# Patient Record
Sex: MALE | Race: WHITE | NOT HISPANIC OR LATINO | Employment: OTHER | URBAN - NONMETROPOLITAN AREA
[De-identification: names, ages, dates, MRNs, and addresses within clinical notes are randomized per-mention and may not be internally consistent; named-entity substitution may affect disease eponyms.]

---

## 2017-02-03 ENCOUNTER — HOSPITAL ENCOUNTER (EMERGENCY)
Facility: HOSPITAL | Age: 62
Discharge: HOME | End: 2017-02-03
Attending: EMERGENCY MEDICINE | Admitting: EMERGENCY MEDICINE

## 2017-02-03 VITALS
WEIGHT: 210 LBS | SYSTOLIC BLOOD PRESSURE: 148 MMHG | HEART RATE: 61 BPM | OXYGEN SATURATION: 100 % | HEIGHT: 71 IN | DIASTOLIC BLOOD PRESSURE: 80 MMHG | BODY MASS INDEX: 29.4 KG/M2 | TEMPERATURE: 97.9 F | RESPIRATION RATE: 18 BRPM

## 2017-02-03 DIAGNOSIS — S05.01XA CORNEAL ABRASION, RIGHT, INITIAL ENCOUNTER: Primary | ICD-10-CM

## 2017-02-03 PROCEDURE — 99284 EMERGENCY DEPT VISIT MOD MDM: CPT

## 2017-02-03 RX ORDER — TETRACAINE HYDROCHLORIDE 5 MG/ML
2 SOLUTION OPHTHALMIC ONCE
Status: COMPLETED | OUTPATIENT
Start: 2017-02-03 | End: 2017-02-03

## 2017-02-03 RX ORDER — ERYTHROMYCIN 5 MG/G
1 OINTMENT OPHTHALMIC ONCE
Status: COMPLETED | OUTPATIENT
Start: 2017-02-03 | End: 2017-02-03

## 2017-02-03 RX ORDER — ERYTHROMYCIN 5 MG/G
OINTMENT OPHTHALMIC EVERY 6 HOURS
Qty: 3.5 G | Refills: 0 | Status: SHIPPED | OUTPATIENT
Start: 2017-02-03 | End: 2017-11-13

## 2017-02-03 RX ADMIN — FLUORESCEIN SODIUM 1 STRIP: 1 STRIP OPHTHALMIC at 20:59

## 2017-02-03 RX ADMIN — TETRACAINE HYDROCHLORIDE 2 DROP: 5 SOLUTION OPHTHALMIC at 21:00

## 2017-02-03 RX ADMIN — ERYTHROMYCIN 1 APPLICATION: 5 OINTMENT OPHTHALMIC at 21:47

## 2017-02-04 NOTE — DISCHARGE INSTRUCTIONS
Use over-the-counter Tylenol as needed for discomfort use the eye ointment as directed avoid direct sunlight avoid bright light follow-up with Dr. Donal Valenzuela call office Monday return to the ER for any sudden changes or worsening pain despite medication the next 24 hours

## 2017-02-04 NOTE — ED PROVIDER NOTES
Subjective   HPI Comments: Patient is here with his wife he reports at work today he felt something hit his right eye piece of with material and feels like there still might be something in the right eye he denies any loss of vision no other systemic complaints patient does not wear glasses, presents here for further evaluation      Review of Systems   Constitutional: Negative.    HENT: Negative.    Eyes: Positive for photophobia. Negative for pain and discharge.   Respiratory: Negative.    Cardiovascular: Negative.    Musculoskeletal: Negative.    Neurological: Negative.    Psychiatric/Behavioral: Negative.    All other systems reviewed and are negative.      Past Medical History   Diagnosis Date   • ACTH deficiency    • ACTH deficiency    • Bursitis of elbow    • Current every day smoker        Allergies   Allergen Reactions   • Wellbutrin [Bupropion]        History reviewed. No pertinent past surgical history.    Family History   Problem Relation Age of Onset   • Cancer Father        Social History     Social History   • Marital status:      Spouse name: N/A   • Number of children: N/A   • Years of education: N/A     Social History Main Topics   • Smoking status: Former Smoker     Quit date: 1/3/2017   • Smokeless tobacco: None   • Alcohol use Yes      Comment: RARE   • Drug use: No   • Sexual activity: Not Asked     Other Topics Concern   • None     Social History Narrative   • None           Objective   Physical Exam   Constitutional: He is oriented to person, place, and time. He appears well-developed and well-nourished.   Cheerful nontoxic well-appearing no acute distress   HENT:   Head: Normocephalic and atraumatic.   Eyes: EOM are normal. Pupils are equal, round, and reactive to light.   There is some slight erythema medial canthus sclera righ  No Hyphema  Noted flourescein reuptake linear 6 o'clock position right  No fb... Examined with slit lamp  t   Neck: Normal range of motion. Neck supple.    Cardiovascular: Normal rate and regular rhythm.    Pulmonary/Chest: Effort normal and breath sounds normal.   Neurological: He is alert and oriented to person, place, and time.   Skin: Skin is warm and dry. No rash noted. No erythema.   Psychiatric: He has a normal mood and affect. His behavior is normal.   Nursing note and vitals reviewed.      Procedures         ED Course  ED Course   Comment By Time   Acuity 20/25 left eye and 20/25 right eye and both Abhi Pham PA-C 02/03 2054   Was examined with slit lamp exam no foreign body noted Abhi Pham PA-C 02/03 2121   There was noted fluorescein reuptake 6 o'clock position right cornea linear in description but again no foreign body Abhi Pham PA-C 02/03 2135                  MDM    Final diagnoses:   Corneal abrasion, right, initial encounter            Abhi Pham PA-C  02/03/17 2122       Abhi Pham PA-C  02/03/17 2135       Abhi Pham PA-C  02/03/17 2136

## 2017-05-30 RX ORDER — PREDNISONE 1 MG/1
TABLET ORAL
Qty: 90 TABLET | Refills: 2 | OUTPATIENT
Start: 2017-05-30

## 2017-09-18 RX ORDER — PREDNISONE 1 MG/1
TABLET ORAL
Qty: 90 TABLET | Refills: 0 | Status: SHIPPED | OUTPATIENT
Start: 2017-09-18 | End: 2017-11-13 | Stop reason: SDUPTHER

## 2017-10-16 ENCOUNTER — FLU SHOT (OUTPATIENT)
Dept: INTERNAL MEDICINE | Facility: CLINIC | Age: 62
End: 2017-10-16

## 2017-10-16 PROCEDURE — 90471 IMMUNIZATION ADMIN: CPT | Performed by: FAMILY MEDICINE

## 2017-10-16 PROCEDURE — 90686 IIV4 VACC NO PRSV 0.5 ML IM: CPT | Performed by: FAMILY MEDICINE

## 2017-11-13 ENCOUNTER — OFFICE VISIT (OUTPATIENT)
Dept: INTERNAL MEDICINE | Facility: CLINIC | Age: 62
End: 2017-11-13

## 2017-11-13 VITALS
TEMPERATURE: 98.1 F | RESPIRATION RATE: 12 BRPM | WEIGHT: 217 LBS | HEIGHT: 71 IN | DIASTOLIC BLOOD PRESSURE: 76 MMHG | OXYGEN SATURATION: 98 % | SYSTOLIC BLOOD PRESSURE: 144 MMHG | HEART RATE: 62 BPM | BODY MASS INDEX: 30.38 KG/M2

## 2017-11-13 DIAGNOSIS — E78.5 HYPERLIPIDEMIA LDL GOAL <130: Chronic | ICD-10-CM

## 2017-11-13 DIAGNOSIS — E23.6 ACTH DEFICIENCY (HCC): Primary | ICD-10-CM

## 2017-11-13 PROCEDURE — 99213 OFFICE O/P EST LOW 20 MIN: CPT | Performed by: FAMILY MEDICINE

## 2017-11-13 RX ORDER — PREDNISONE 5 MG/1
1 TABLET ORAL TAKE AS DIRECTED
Qty: 1 EACH | Refills: 1 | Status: SHIPPED | OUTPATIENT
Start: 2017-11-13 | End: 2018-01-08 | Stop reason: SDUPTHER

## 2017-11-13 RX ORDER — PREDNISONE 1 MG/1
5 TABLET ORAL DAILY
Qty: 30 TABLET | Refills: 11 | Status: SHIPPED | OUTPATIENT
Start: 2017-11-13 | End: 2018-11-14 | Stop reason: SDUPTHER

## 2017-11-13 NOTE — PROGRESS NOTES
Subjective    Yovany Gonzalez is a 62 y.o. male here for:  Chief Complaint   Patient presents with   • Establish Care     Transfer care form Dr. Baer to Dr. Ewing   • ACTH deficiency     History of Present Illness   He has an ACTH deficiency which was diagnosed previously by [facility] after a prolonged course of illness. Takes prednisone 5 mg daily and will for rest of life. He sometimes has to take more, such as 10-15 mg if he's sick. When this happens, he runs out early on his maintenance dose.     The following portions of the patient's history were reviewed and updated as appropriate: allergies, current medications, past family history, past medical history, past social history, past surgical history and problem list.    Review of Systems   Respiratory: Negative for shortness of breath.    Cardiovascular: Negative for chest pain.       Vitals:    11/13/17 1548   BP: 144/76   Pulse: 62   Resp: 12   Temp: 98.1 °F (36.7 °C)   SpO2: 98%       Objective   Physical Exam   Constitutional: He is oriented to person, place, and time. Vital signs are normal. He appears well-developed and well-nourished. He is active. He does not have a sickly appearance. He does not appear ill.   Appears stated age. Well groomed.   HENT:   Head: Normocephalic and atraumatic.   Right Ear: Hearing normal.   Left Ear: Hearing normal.   Nose: Nose normal.   Mouth/Throat: Mucous membranes are not dry.   Eyes: EOM and lids are normal. Pupils are equal, round, and reactive to light. No scleral icterus.   Cardiovascular: Normal rate, regular rhythm and normal heart sounds.  Exam reveals no gallop and no friction rub.    No murmur heard.  Pulmonary/Chest: Effort normal and breath sounds normal.   Musculoskeletal: He exhibits no deformity.   Neurological: He is alert and oriented to person, place, and time. He displays no tremor. No cranial nerve deficit. Gait normal.   Skin: Skin is warm. He is not diaphoretic. No cyanosis. Nails show no  clubbing.   Psychiatric: He has a normal mood and affect. His speech is normal and behavior is normal. Judgment and thought content normal. Cognition and memory are normal.   Nursing note and vitals reviewed.      Assessment/Plan   Yovany was seen today for establish care and acth deficiency.    Diagnoses and all orders for this visit:    ACTH deficiency  -     PredniSONE 5 MG (48) tablet therapy pack dosepak; Take 1 tablet by mouth Take As Directed. Take as directed on package instructions.  -     predniSONE (DELTASONE) 5 MG tablet; Take 1 tablet by mouth Daily.  -     CBC & Differential  -     Comprehensive Metabolic Panel  -     TSH  -     T4, Free  -     Iron Profile  -     Vitamin B12 & Folate  -     Hemoglobin A1c  -     Celiac Disease Panel  -     Prolactin  -     Testosterone  -     Androstenedione  -     ACTH  -     Cortisol - AM    Hyperlipidemia LDL goal <130  -     Lipid Panel      For future reference regarding Mr. Hodgson's condition:    Diagnosis and Treatment of Primary Adrenal Insufficiency: An Endocrine Society Clinical Practice Guideline   Stefan R. Bornstein Bruno Allolio Wiebke Arlt Andreas Barthel Andrew Don-Wauchope Gary D. Hammer Eystein S. Husebye Deborah P. Merke M. Hassan Murad Constantine A. Stratakis ... Show more\[email or web address] Journal of Clinical Endocrinology & Metabolism, Volume 101, Issue 2, 1 February 2016, Pages 364-389, [email or web address]: 01 February 2016  [email or web address]    Records from [facility] not available at this time for review.           Fernanda Ewing MD

## 2017-11-16 PROBLEM — E78.5 HYPERLIPIDEMIA LDL GOAL <130: Chronic | Status: ACTIVE | Noted: 2017-11-16

## 2017-12-30 LAB
ACTH PLAS-MCNC: <1.1 PG/ML (ref 7.2–63.3)
ALBUMIN SERPL-MCNC: 4 G/DL (ref 3.5–5)
ALBUMIN/GLOB SERPL: 1.5 G/DL (ref 1–2)
ALP SERPL-CCNC: 53 U/L (ref 38–126)
ALT SERPL-CCNC: 30 U/L (ref 13–69)
ANDROST SERPL-MCNC: 32 NG/DL (ref 22–96)
AST SERPL-CCNC: 24 U/L (ref 15–46)
BASOPHILS # BLD AUTO: 0.07 10*3/MM3 (ref 0–0.2)
BASOPHILS NFR BLD AUTO: 1.1 % (ref 0–2.5)
BILIRUB SERPL-MCNC: 0.5 MG/DL (ref 0.2–1.3)
BUN SERPL-MCNC: 24 MG/DL (ref 7–20)
BUN/CREAT SERPL: 24 (ref 6.3–21.9)
CALCIUM SERPL-MCNC: 9.5 MG/DL (ref 8.4–10.2)
CHLORIDE SERPL-SCNC: 102 MMOL/L (ref 98–107)
CHOLEST SERPL-MCNC: 238 MG/DL (ref 0–199)
CO2 SERPL-SCNC: 33 MMOL/L (ref 26–30)
CORTIS AM PEAK SERPL-MCNC: 1.5 UG/DL (ref 6.2–19.4)
CREAT SERPL-MCNC: 1 MG/DL (ref 0.6–1.3)
ENDOMYSIUM IGA SER QL: NEGATIVE
EOSINOPHIL # BLD AUTO: 0.32 10*3/MM3 (ref 0–0.7)
EOSINOPHIL NFR BLD AUTO: 5 % (ref 0–7)
ERYTHROCYTE [DISTWIDTH] IN BLOOD BY AUTOMATED COUNT: 12.6 % (ref 11.5–14.5)
FOLATE SERPL-MCNC: 9.86 NG/ML
GLOBULIN SER CALC-MCNC: 2.6 GM/DL
GLUCOSE SERPL-MCNC: 78 MG/DL (ref 74–98)
HBA1C MFR BLD: 5.3 %
HCT VFR BLD AUTO: 41.8 % (ref 42–52)
HDLC SERPL-MCNC: 53 MG/DL (ref 40–60)
HGB BLD-MCNC: 13.2 G/DL (ref 14–18)
IGA SERPL-MCNC: 104 MG/DL (ref 61–437)
IMM GRANULOCYTES # BLD: 0.02 10*3/MM3 (ref 0–0.06)
IMM GRANULOCYTES NFR BLD: 0.3 % (ref 0–0.6)
IRON SATN MFR SERPL: 48 % (ref 11–46)
IRON SERPL-MCNC: 106 MCG/DL (ref 37–181)
LDLC SERPL CALC-MCNC: 166 MG/DL (ref 0–99)
LYMPHOCYTES # BLD AUTO: 1.86 10*3/MM3 (ref 0.6–3.4)
LYMPHOCYTES NFR BLD AUTO: 29 % (ref 10–50)
MCH RBC QN AUTO: 27.4 PG (ref 27–31)
MCHC RBC AUTO-ENTMCNC: 31.6 G/DL (ref 30–37)
MCV RBC AUTO: 86.7 FL (ref 80–94)
MONOCYTES # BLD AUTO: 0.55 10*3/MM3 (ref 0–0.9)
MONOCYTES NFR BLD AUTO: 8.6 % (ref 0–12)
NEUTROPHILS # BLD AUTO: 3.59 10*3/MM3 (ref 2–6.9)
NEUTROPHILS NFR BLD AUTO: 56 % (ref 37–80)
NRBC BLD AUTO-RTO: 0 /100 WBC (ref 0–0)
PLATELET # BLD AUTO: 198 10*3/MM3 (ref 130–400)
POTASSIUM SERPL-SCNC: 4.2 MMOL/L (ref 3.5–5.1)
PROLACTIN SERPL-MCNC: 9.5 NG/ML (ref 4–15.2)
PROT SERPL-MCNC: 6.6 G/DL (ref 6.3–8.2)
RBC # BLD AUTO: 4.82 10*6/MM3 (ref 4.7–6.1)
SODIUM SERPL-SCNC: 141 MMOL/L (ref 137–145)
T4 FREE SERPL-MCNC: 1 NG/DL (ref 0.78–2.19)
TESTOST SERPL-MCNC: 486 NG/DL (ref 264–916)
TIBC SERPL-MCNC: 220 MCG/DL (ref 261–497)
TRIGL SERPL-MCNC: 94 MG/DL
TSH SERPL DL<=0.005 MIU/L-ACNC: 2.76 MIU/ML (ref 0.47–4.68)
TTG IGA SER-ACNC: <2 U/ML (ref 0–3)
UIBC SERPL-MCNC: 114 MCG/DL
VIT B12 SERPL-MCNC: 484 PG/ML (ref 239–931)
VLDLC SERPL CALC-MCNC: 18.8 MG/DL
WBC # BLD AUTO: 6.41 10*3/MM3 (ref 4.8–10.8)

## 2018-01-02 ENCOUNTER — TELEPHONE (OUTPATIENT)
Dept: INTERNAL MEDICINE | Facility: CLINIC | Age: 63
End: 2018-01-02

## 2018-01-02 DIAGNOSIS — R79.89 ABNORMAL CBC: Primary | ICD-10-CM

## 2018-01-08 ENCOUNTER — OFFICE VISIT (OUTPATIENT)
Dept: INTERNAL MEDICINE | Facility: CLINIC | Age: 63
End: 2018-01-08

## 2018-01-08 VITALS
SYSTOLIC BLOOD PRESSURE: 124 MMHG | WEIGHT: 214 LBS | BODY MASS INDEX: 29.96 KG/M2 | HEART RATE: 84 BPM | RESPIRATION RATE: 14 BRPM | DIASTOLIC BLOOD PRESSURE: 80 MMHG | HEIGHT: 71 IN | OXYGEN SATURATION: 96 % | TEMPERATURE: 98.3 F

## 2018-01-08 DIAGNOSIS — R68.83 CHILLS: Primary | ICD-10-CM

## 2018-01-08 DIAGNOSIS — R11.10 VOMITING, INTRACTABILITY OF VOMITING NOT SPECIFIED, PRESENCE OF NAUSEA NOT SPECIFIED, UNSPECIFIED VOMITING TYPE: ICD-10-CM

## 2018-01-08 LAB
EXPIRATION DATE: NORMAL
FLUAV AG NPH QL: NEGATIVE
FLUBV AG NPH QL: NEGATIVE
INTERNAL CONTROL: NORMAL
Lab: NORMAL

## 2018-01-08 PROCEDURE — 87804 INFLUENZA ASSAY W/OPTIC: CPT | Performed by: FAMILY MEDICINE

## 2018-01-08 PROCEDURE — 99213 OFFICE O/P EST LOW 20 MIN: CPT | Performed by: FAMILY MEDICINE

## 2018-01-08 RX ORDER — ONDANSETRON 8 MG/1
8 TABLET, ORALLY DISINTEGRATING ORAL EVERY 8 HOURS PRN
Qty: 15 TABLET | Refills: 0 | Status: SHIPPED | OUTPATIENT
Start: 2018-01-08 | End: 2018-11-14

## 2018-01-08 NOTE — PROGRESS NOTES
Subjective    Yovany Gonzalez is a 63 y.o. male here for:  Chief Complaint   Patient presents with   • Vomiting   • Chills     History of Present Illness     Upset stomach and diarrhea. Some fevers last night. Chills as well. Achy and tired from not sleeping well.     The following portions of the patient's history were reviewed and updated as appropriate: allergies, current medications, past family history, past medical history, past social history, past surgical history and problem list.    Review of Systems   Constitutional: Positive for activity change, appetite change, chills, fatigue and fever.   Respiratory: Negative for shortness of breath.    Gastrointestinal: Positive for diarrhea and nausea.   Musculoskeletal: Positive for myalgias.   Psychiatric/Behavioral: Positive for sleep disturbance.       Vitals:    01/08/18 1149   BP: 124/80   Pulse: 84   Resp: 14   Temp: 98.3 °F (36.8 °C)   SpO2: 96%         Objective   Physical Exam   Constitutional: He is oriented to person, place, and time. Vital signs are normal. He appears well-developed and well-nourished. He is active. He does not have a sickly appearance. He does not appear ill.   Appears stated age. Well groomed.   HENT:   Head: Normocephalic and atraumatic.   Right Ear: Hearing normal.   Left Ear: Hearing normal.   Nose: Nose normal.   Mouth/Throat: Mucous membranes are not dry.   Eyes: EOM and lids are normal. Pupils are equal, round, and reactive to light. No scleral icterus.   Cardiovascular: Normal rate, regular rhythm and normal heart sounds.  Exam reveals no gallop and no friction rub.    No murmur heard.  Pulmonary/Chest: Effort normal and breath sounds normal.   Abdominal: Soft. Bowel sounds are normal. There is no tenderness. There is no rigidity, no rebound and no guarding.   Musculoskeletal: He exhibits no deformity.   Neurological: He is alert and oriented to person, place, and time. He displays no tremor. No cranial nerve deficit. Gait  normal.   Skin: Skin is warm. He is not diaphoretic. No cyanosis. Nails show no clubbing.   Psychiatric: He has a normal mood and affect. His speech is normal and behavior is normal. Judgment and thought content normal. Cognition and memory are normal.   Nursing note and vitals reviewed.      Lab Results   Component Value Date    RAPFLUA NEGATIVE 01/08/2018    RAPFLUB NEGATIVE 01/08/2018         Assessment/Plan       Yovany was seen today for vomiting and chills.    Diagnoses and all orders for this visit:    Chills  -     POCT Influenza A/B    Vomiting, intractability of vomiting not specified, presence of nausea not specified, unspecified vomiting type  -     POCT Influenza A/B  -     ondansetron ODT (ZOFRAN ODT) 8 MG disintegrating tablet; Take 1 tablet by mouth Every 8 (Eight) Hours As Needed for Nausea or Vomiting.        · Encouraged hydration, rest, tylenol/motrin for discomfort.  · Likely viral.  · Continue prednisone as prescribed for acth deficiency.      Fernanda Ewing MD    Please note that portions of this note were completed with a voice recognition program. Efforts were made to edit dictation, but occasionally words are mistranscribed.

## 2018-11-14 ENCOUNTER — OFFICE VISIT (OUTPATIENT)
Dept: INTERNAL MEDICINE | Facility: CLINIC | Age: 63
End: 2018-11-14

## 2018-11-14 VITALS
HEIGHT: 71 IN | DIASTOLIC BLOOD PRESSURE: 85 MMHG | HEART RATE: 65 BPM | SYSTOLIC BLOOD PRESSURE: 120 MMHG | WEIGHT: 214 LBS | BODY MASS INDEX: 29.96 KG/M2 | OXYGEN SATURATION: 97 % | TEMPERATURE: 98.7 F

## 2018-11-14 DIAGNOSIS — Z00.00 ANNUAL PHYSICAL EXAM: Primary | ICD-10-CM

## 2018-11-14 DIAGNOSIS — E23.6 ACTH DEFICIENCY (HCC): ICD-10-CM

## 2018-11-14 PROCEDURE — 99396 PREV VISIT EST AGE 40-64: CPT | Performed by: FAMILY MEDICINE

## 2018-11-14 RX ORDER — PREDNISONE 5 MG/1
1 TABLET ORAL TAKE AS DIRECTED
Qty: 1 EACH | Refills: 2 | Status: SHIPPED | OUTPATIENT
Start: 2018-11-14 | End: 2019-07-11 | Stop reason: SDUPTHER

## 2018-11-14 RX ORDER — PREDNISONE 1 MG/1
5 TABLET ORAL DAILY
Qty: 90 TABLET | Refills: 3 | Status: SHIPPED | OUTPATIENT
Start: 2018-11-14 | End: 2019-11-15 | Stop reason: SDUPTHER

## 2018-11-14 NOTE — PATIENT INSTRUCTIONS
Health Maintenance, Male  A healthy lifestyle and preventive care is important for your health and wellness. Ask your health care provider about what schedule of regular examinations is right for you.  What should I know about weight and diet?    Eat a Healthy Diet  · Eat plenty of vegetables, fruits, whole grains, low-fat dairy products, and lean protein.  · Do not eat a lot of foods high in solid fats, added sugars, or salt.     Maintain a Healthy Weight  Regular exercise can help you achieve or maintain a healthy weight. You should:  · Do at least 150 minutes of exercise each week. The exercise should increase your heart rate and make you sweat (moderate-intensity exercise).  · Do strength-training exercises at least twice a week.     Watch Your Levels of Cholesterol and Blood Lipids  · Have your blood tested for lipids and cholesterol every 5 years starting at 35 years of age. If you are at high risk for heart disease, you should start having your blood tested when you are 20 years old. You may need to have your cholesterol levels checked more often if:  ? Your lipid or cholesterol levels are high.  ? You are older than 50 years of age.  ? You are at high risk for heart disease.     What should I know about cancer screening?  Many types of cancers can be detected early and may often be prevented.  Lung Cancer  · You should be screened every year for lung cancer if:  ? You are a current smoker who has smoked for at least 30 years.  ? You are a former smoker who has quit within the past 15 years.  · Talk to your health care provider about your screening options, when you should start screening, and how often you should be screened.     Colorectal Cancer  · Routine colorectal cancer screening usually begins at 50 years of age and should be repeated every 5-10 years until you are 75 years old. You may need to be screened more often if early forms of precancerous polyps or small growths are found. Your health care  provider may recommend screening at an earlier age if you have risk factors for colon cancer.  · Your health care provider may recommend using home test kits to check for hidden blood in the stool.  · A small camera at the end of a tube can be used to examine your colon (sigmoidoscopy or colonoscopy). This checks for the earliest forms of colorectal cancer.     Prostate and Testicular Cancer  · Depending on your age and overall health, your health care provider may do certain tests to screen for prostate and testicular cancer.  · Talk to your health care provider about any symptoms or concerns you have about testicular or prostate cancer.     Skin Cancer  · Check your skin from head to toe regularly.  · Tell your health care provider about any new moles or changes in moles, especially if:  ? There is a change in a mole’s size, shape, or color.  ? You have a mole that is larger than a pencil eraser.  · Always use sunscreen. Apply sunscreen liberally and repeat throughout the day.  · Protect yourself by wearing long sleeves, pants, a wide-brimmed hat, and sunglasses when outside.     What should I know about heart disease, diabetes, and high blood pressure?  · If you are 18-39 years of age, have your blood pressure checked every 3-5 years. If you are 40 years of age or older, have your blood pressure checked every year. You should have your blood pressure measured twice--once when you are at a hospital or clinic, and once when you are not at a hospital or clinic. Record the average of the two measurements. To check your blood pressure when you are not at a hospital or clinic, you can use:  ? An automated blood pressure machine at a pharmacy.  ? A home blood pressure monitor.  · Talk to your health care provider about your target blood pressure.  · If you are between 45-79 years old, ask your health care provider if you should take aspirin to prevent heart disease.  · Have regular diabetes screenings by checking your  fasting blood sugar level.  ? If you are at a normal weight and have a low risk for diabetes, have this test once every three years after the age of 45.  ? If you are overweight and have a high risk for diabetes, consider being tested at a younger age or more often.  · A one-time screening for abdominal aortic aneurysm (AAA) by ultrasound is recommended for men aged 65-75 years who are current or former smokers.  What should I know about preventing infection?  Hepatitis B  If you have a higher risk for hepatitis B, you should be screened for this virus. Talk with your health care provider to find out if you are at risk for hepatitis B infection.  Hepatitis C  Blood testing is recommended for:  · Everyone born from 1945 through 1965.  · Anyone with known risk factors for hepatitis C.     Sexually Transmitted Diseases (STDs)  · You should be screened each year for STDs including gonorrhea and chlamydia if:  ? You are sexually active and are younger than 24 years of age.  ? You are older than 24 years of age and your health care provider tells you that you are at risk for this type of infection.  ? Your sexual activity has changed since you were last screened and you are at an increased risk for chlamydia or gonorrhea. Ask your health care provider if you are at risk.  · Talk with your health care provider about whether you are at high risk of being infected with HIV. Your health care provider may recommend a prescription medicine to help prevent HIV infection.     What else can I do?  ·   · Schedule regular health, dental, and eye exams.  · Stay current with your vaccines (immunizations).  · Do not use any tobacco products, such as cigarettes, chewing tobacco, and e-cigarettes. If you need help quitting, ask your health care provider.  · Limit alcohol intake to no more than 2 drinks per day. One drink equals 12 ounces of beer, 5 ounces of wine, or 1½ ounces of hard liquor.  · Do not use street drugs.  · Do not share  needles.  · Ask your health care provider for help if you need support or information about quitting drugs.  · Tell your health care provider if you often feel depressed.  · Tell your health care provider if you have ever been abused or do not feel safe at home.      This information is not intended to replace advice given to you by your health care provider. Make sure you discuss any questions you have with your health care provider.  Document Released: 06/15/2009 Document Revised: 08/16/2017 Document Reviewed: 09/20/2016  Elsevier Interactive Patient Education © 2018 [employer]vier Inc[employer]

## 2018-11-19 NOTE — PROGRESS NOTES
"11/14/2018  Chief Complaint   Patient presents with   • Annual Exam     Annual Physical         Yovany Gonzalez is here for his annual preventive exam.    Yovany Gonzalez has the following medical history:  Patient Active Problem List    Diagnosis   • Hyperlipidemia LDL goal <130 [E78.5]   • ACTH deficiency (CMS/HCC) [E23.6]     Overall healthy. Continues to take prednisone due to acth deficiency diagnosed previously at [facility]. He needs a refill on the prednisone dose packs which he takes prn illness so that he does not run out monthly.     Yovany Gonzalez is up to date on eye exam.  he is up to date on dental exam.    Screenings: declines colonoscopy at this time, okay to send order for Cologuard. Due for hep C screen.    Vaccinations: declines tetanus at this time, up to date on influenza    Vitals:    11/14/18 1550   BP: 120/85   Pulse: 65   Temp: 98.7 °F (37.1 °C)   SpO2: 97%   Weight: 97.1 kg (214 lb)   Height: 180.3 cm (70.98\")     Patient's Body mass index is 29.86 kg/m². BMI is above normal parameters. Recommendations include: educational material and nutrition counseling.      Review of Systems   Constitutional: Positive for activity change (retired from job).   Respiratory: Negative for shortness of breath.    Cardiovascular: Negative for chest pain.   Gastrointestinal: Negative for abdominal pain.   Psychiatric/Behavioral: Positive for stress.   All other systems reviewed and are negative.      Physical Exam   Constitutional: He is oriented to person, place, and time. Vital signs are normal. He appears well-developed and well-nourished. He is active.  Non-toxic appearance. He does not have a sickly appearance. He does not appear ill. No distress.   HENT:   Head: Normocephalic and atraumatic. Hair is normal.   Right Ear: Hearing, tympanic membrane, external ear and ear canal normal.   Left Ear: Hearing, tympanic membrane, external ear and ear canal normal.   Nose: Nose normal.   Mouth/Throat: Uvula is " midline, oropharynx is clear and moist and mucous membranes are normal. Normal dentition.   Eyes: Conjunctivae, EOM and lids are normal. Pupils are equal, round, and reactive to light. No scleral icterus.   Neck: Trachea normal and phonation normal. Neck supple.   Cardiovascular: Normal rate, regular rhythm, normal heart sounds and intact distal pulses.   Pulmonary/Chest: Effort normal and breath sounds normal. He exhibits no deformity.   Abdominal: Soft. Bowel sounds are normal. There is no tenderness.   Lymphadenopathy:        Head (right side): No submandibular adenopathy present.        Head (left side): No submandibular adenopathy present.     He has no cervical adenopathy.   Neurological: He is alert and oriented to person, place, and time. He has normal strength. He displays no tremor. No cranial nerve deficit. He exhibits normal muscle tone. He displays no seizure activity. Gait normal.   Skin: Skin is warm. Capillary refill takes less than 2 seconds. Turgor is normal. No rash noted. He is not diaphoretic. No cyanosis. No pallor. Nails show no clubbing.   Psychiatric: He has a normal mood and affect. His speech is normal and behavior is normal. Judgment and thought content normal. Cognition and memory are normal. He is attentive.   Nursing note and vitals reviewed.      Health Maintenance   Topic Date Due   • TDAP/TD VACCINES (1 - Tdap) 01/04/1975   • COLOGUARD  12/31/2018 (Originally 10/12/2016)   • HEPATITIS C SCREENING  11/01/2019 (Originally 10/4/2016)   • ZOSTER VACCINE (1 of 2) 11/27/2019 (Originally 1/4/2006)   • LIPID PANEL  12/26/2018   • ANNUAL PHYSICAL  11/15/2019   • COLONOSCOPY  11/14/2028   • INFLUENZA VACCINE  Completed       Problem List Items Addressed This Visit        Endocrine    ACTH deficiency (CMS/HCC)    Relevant Medications    predniSONE (DELTASONE) 5 MG tablet    PredniSONE 5 MG (48) tablet therapy pack dosepak      Other Visit Diagnoses     Annual physical exam    -  Primary     Relevant Orders    Comprehensive Metabolic Panel    CBC & Differential          · Discussed healthy diet and encouraged exercise. Health maintenance information provided with patient plan.   ·         Fernanda Ewing MD

## 2018-12-26 ENCOUNTER — RESULTS ENCOUNTER (OUTPATIENT)
Dept: INTERNAL MEDICINE | Facility: CLINIC | Age: 63
End: 2018-12-26

## 2018-12-26 DIAGNOSIS — Z00.00 ANNUAL PHYSICAL EXAM: ICD-10-CM

## 2019-07-11 ENCOUNTER — OFFICE VISIT (OUTPATIENT)
Dept: INTERNAL MEDICINE | Facility: CLINIC | Age: 64
End: 2019-07-11

## 2019-07-11 ENCOUNTER — RESULTS ENCOUNTER (OUTPATIENT)
Dept: INTERNAL MEDICINE | Facility: CLINIC | Age: 64
End: 2019-07-11

## 2019-07-11 VITALS
DIASTOLIC BLOOD PRESSURE: 82 MMHG | WEIGHT: 211.75 LBS | SYSTOLIC BLOOD PRESSURE: 130 MMHG | HEART RATE: 67 BPM | BODY MASS INDEX: 29.65 KG/M2 | RESPIRATION RATE: 16 BRPM | TEMPERATURE: 97.7 F | OXYGEN SATURATION: 98 % | HEIGHT: 71 IN

## 2019-07-11 DIAGNOSIS — Z12.11 COLON CANCER SCREENING: Primary | ICD-10-CM

## 2019-07-11 DIAGNOSIS — S46.812A STRAIN OF LEFT TRAPEZIUS MUSCLE, INITIAL ENCOUNTER: ICD-10-CM

## 2019-07-11 DIAGNOSIS — Z12.11 COLON CANCER SCREENING: ICD-10-CM

## 2019-07-11 PROCEDURE — 99213 OFFICE O/P EST LOW 20 MIN: CPT | Performed by: FAMILY MEDICINE

## 2019-07-11 RX ORDER — TIZANIDINE 4 MG/1
4 TABLET ORAL EVERY 8 HOURS PRN
Qty: 90 TABLET | Refills: 5 | Status: SHIPPED | OUTPATIENT
Start: 2019-07-11 | End: 2020-12-17

## 2019-07-11 NOTE — PROGRESS NOTES
"Subjective    Yovany Gonzalez is a 64 y.o. 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 Since [facility] he's had soreness in the left upper arm. Was sore to lay on at night. It eventually improved in arm but shoulder still hurts and radiates up toward neck it feels. Shoulder now getting sore when he lays on it. Hurts day and night. Advil helps somewhat. He feels it's somewhat better when he's working, more noticeable when resting. Rates 5-6/10, can be bothersome.     The following portions of the patient's history were reviewed and updated as appropriate: allergies, current medications, past family history, past medical history, past social history, past surgical history and problem list.    Health Maintenance   Topic Date Due   • PNEUMOCOCCAL VACCINE (19-64 MEDIUM RISK) (1 of 1 - PPSV23) 01/04/1975   • LIPID PANEL  12/26/2018   • COLOGUARD  09/01/2019 (Originally 10/12/2016)   • HEPATITIS C SCREENING  11/01/2019 (Originally 10/4/2016)   • ZOSTER VACCINE (1 of 2) 11/27/2019 (Originally 1/4/2006)   • TDAP/TD VACCINES (1 - Tdap) 07/11/2020 (Originally 1/4/1975)   • INFLUENZA VACCINE  08/01/2019   • ANNUAL PHYSICAL  11/15/2019       Review of Systems   Musculoskeletal: Positive for arthralgias, myalgias and neck pain.       Vitals:    07/11/19 1026   BP: 130/82   Pulse: 67   Resp: 16   Temp: 97.7 °F (36.5 °C)   TempSrc: Temporal   SpO2: 98%   Weight: 96 kg (211 lb 12 oz)   Height: 180.3 cm (70.98\")         Objective   Physical Exam   Constitutional: He is oriented to person, place, and time. Vital signs are normal. He appears well-developed and well-nourished. He is active.  Non-toxic appearance. He does not have a sickly appearance. He does not appear ill. No distress.   HENT:   Head: " Normocephalic and atraumatic.   Right Ear: Hearing normal.   Left Ear: Hearing normal.   Nose: Nose normal.   Mouth/Throat: Mucous membranes are not dry.   Eyes: EOM are normal. No scleral icterus.   Neck: Phonation normal. Neck supple.   Pulmonary/Chest: Effort normal.   Musculoskeletal:        Right shoulder: He exhibits normal range of motion.        Left shoulder: He exhibits tenderness (ac joint, posterior to trapezius muscle). He exhibits normal range of motion and normal strength.   Negative empty can test bilaterally   Neurological: He is alert and oriented to person, place, and time. He displays no tremor. No cranial nerve deficit.   Skin: Skin is warm. He is not diaphoretic. No cyanosis. No pallor.   Psychiatric: He has a normal mood and affect. His speech is normal and behavior is normal. Judgment and thought content normal. Cognition and memory are normal.   Nursing note and vitals reviewed.        Assessment/Plan     Problem List Items Addressed This Visit     None      Visit Diagnoses     Colon cancer screening    -  Primary    Relevant Orders    Cologuard - Stool, Per Rectum    Strain of left trapezius muscle, initial encounter        Relevant Medications    tiZANidine (ZANAFLEX) 4 MG tablet          · If shoulder does not improve will consider imaging, PT    Fernanda Ewing MD

## 2019-08-05 ENCOUNTER — PATIENT MESSAGE (OUTPATIENT)
Dept: INTERNAL MEDICINE | Facility: CLINIC | Age: 64
End: 2019-08-05

## 2019-08-05 DIAGNOSIS — R19.5 POSITIVE COLORECTAL CANCER SCREENING USING COLOGUARD TEST: Primary | ICD-10-CM

## 2019-08-05 NOTE — TELEPHONE ENCOUNTER
From: Fernanda Ewing MD  To: Yovany Gonzalez  Sent: 8/5/2019 10:33 AM EDT  Subject: cologuard result    Please review the scanned Cologuard result in My Chart. It was positive. I suggest proceeding to colonoscopy in this situation. Do you have a particular provider you would like to see for this?    Dr. Ewing

## 2019-08-09 ENCOUNTER — OFFICE VISIT (OUTPATIENT)
Dept: SURGERY | Facility: CLINIC | Age: 64
End: 2019-08-09

## 2019-08-09 VITALS
BODY MASS INDEX: 29.12 KG/M2 | DIASTOLIC BLOOD PRESSURE: 70 MMHG | HEIGHT: 71 IN | TEMPERATURE: 98.7 F | HEART RATE: 70 BPM | WEIGHT: 208 LBS | SYSTOLIC BLOOD PRESSURE: 116 MMHG | OXYGEN SATURATION: 98 %

## 2019-08-09 DIAGNOSIS — R19.5 POSITIVE COLORECTAL CANCER SCREENING USING COLOGUARD TEST: Primary | ICD-10-CM

## 2019-08-09 PROCEDURE — 99203 OFFICE O/P NEW LOW 30 MIN: CPT | Performed by: SURGERY

## 2019-08-09 RX ORDER — POLYETHYLENE GLYCOL 3350 17 G/17G
POWDER, FOR SOLUTION ORAL
Qty: 238 G | Refills: 0 | Status: SHIPPED | OUTPATIENT
Start: 2019-08-09 | End: 2019-08-20 | Stop reason: HOSPADM

## 2019-08-09 RX ORDER — BISACODYL 5 MG/1
TABLET, DELAYED RELEASE ORAL
Qty: 4 TABLET | Refills: 0 | Status: SHIPPED | OUTPATIENT
Start: 2019-08-09 | End: 2019-08-20 | Stop reason: HOSPADM

## 2019-08-09 NOTE — H&P (VIEW-ONLY)
Patient: Yovany Gonzalez    YOB: 1955    Date: 2019    Primary Care Provider: Fernanda Ewing MD    Chief Complaint   Patient presents with   • Rectal Bleeding       SUBJECTIVE:    History of present illness: Patient here with history of a positive Cologuard performed 2 weeks ago.  He states that his last colonoscopy is at least 8 years ago.  He has no associated GI complaints.  No associated black or bloody bowel movements.  No abdominal pain.    The following portions of the patient's history were reviewed and updated as appropriate: allergies, current medications, past family history, past medical history, past social history, past surgical history and problem list.      Review of Systems   Constitutional: Negative for chills, diaphoresis, fatigue and fever.   HENT: Negative for dental problem, drooling, ear discharge and hearing loss.    Respiratory: Negative for cough, choking, chest tightness and shortness of breath.    Cardiovascular: Negative for chest pain, palpitations and leg swelling.   Gastrointestinal: Positive for blood in stool. Negative for abdominal pain, constipation and diarrhea.   Endocrine: Negative for cold intolerance and heat intolerance.   Genitourinary: Negative for flank pain, frequency and hematuria.   Neurological: Negative for seizures, light-headedness, numbness and headaches.   Psychiatric/Behavioral: Negative for agitation, behavioral problems and confusion.       History:  Past Medical History:   Diagnosis Date   • ACTH deficiency (CMS/HCC)    • Bursitis of elbow    • Hyperlipidemia        History reviewed. No pertinent surgical history.    Family History   Problem Relation Age of Onset   • Cancer Father        Social History     Tobacco Use   • Smoking status: Current Some Day Smoker     Last attempt to quit: 1/3/2017     Years since quittin.5   • Smokeless tobacco: Never Used   Substance Use Topics   • Alcohol use: Yes     Comment: RARE   • Drug  "use: No       Medications:   Current Outpatient Medications:   •  predniSONE (DELTASONE) 5 MG tablet, Take 1 tablet by mouth Daily., Disp: 90 tablet, Rfl: 3  •  tiZANidine (ZANAFLEX) 4 MG tablet, Take 1 tablet by mouth Every 8 (Eight) Hours As Needed for Muscle Spasms., Disp: 90 tablet, Rfl: 5       Allergies:   Allergies   Allergen Reactions   • Wellbutrin [Bupropion]        OBJECTIVE:    Vital Signs:  Vitals:    08/09/19 0901   BP: 116/70   Pulse: 70   Temp: 98.7 °F (37.1 °C)   SpO2: 98%   Weight: 94.3 kg (208 lb)   Height: 180.3 cm (70.98\")       Physical Exam:  General Appearance:    Alert, cooperative, in no acute distress   Head:    Normocephalic, without obvious abnormality, atraumatic   Eyes:            Normal.  No scleral icterus.  PERRLA    Lungs:     Clear to auscultation,respirations regular, even and                  unlabored    Heart:    Regular rhythm and normal rate, normal S1 and S2, no            murmur   Abdomen:     Normal bowel sounds, no masses, no organomegaly, soft        non-tender, non-distended, no guarding,    Extremities:   Moves all extremities well, no edema, no cyanosis, no             redness   Skin:   No bleeding, bruising or rash   Neurologic:   Normal without gross deficits.   Psychiatric: No evidence of depression or anxiety         Results Review:   I reviewed the patient's new clinical results.  Cologuard reviewed    Review of Systems was reviewed and confirmed as accurate today.    ASSESSMENT/PLAN:    1. Positive colorectal cancer screening using Cologuard test        Patient with a positive Cologuard.  I recommend a colonoscopy.  I explained the procedure to the patient as well as the risks of bleeding and perforation and they understand the ramifications of these potential complications and they wish to proceed.     Electronically signed by Elmo Sanchez MD  08/09/19  8:57 AM          "

## 2019-08-16 ENCOUNTER — TELEPHONE (OUTPATIENT)
Dept: SURGERY | Facility: CLINIC | Age: 64
End: 2019-08-16

## 2019-08-20 ENCOUNTER — HOSPITAL ENCOUNTER (OUTPATIENT)
Facility: HOSPITAL | Age: 64
Setting detail: HOSPITAL OUTPATIENT SURGERY
Discharge: HOME | End: 2019-08-20
Attending: SURGERY | Admitting: SURGERY

## 2019-08-20 ENCOUNTER — ANESTHESIA (OUTPATIENT)
Dept: GASTROENTEROLOGY | Facility: HOSPITAL | Age: 64
End: 2019-08-20

## 2019-08-20 ENCOUNTER — ANESTHESIA EVENT (OUTPATIENT)
Dept: GASTROENTEROLOGY | Facility: HOSPITAL | Age: 64
End: 2019-08-20

## 2019-08-20 VITALS
HEIGHT: 71 IN | OXYGEN SATURATION: 100 % | TEMPERATURE: 97.6 F | BODY MASS INDEX: 29.12 KG/M2 | WEIGHT: 208 LBS | DIASTOLIC BLOOD PRESSURE: 78 MMHG | HEART RATE: 51 BPM | RESPIRATION RATE: 18 BRPM | SYSTOLIC BLOOD PRESSURE: 143 MMHG

## 2019-08-20 DIAGNOSIS — R19.5 POSITIVE COLORECTAL CANCER SCREENING USING COLOGUARD TEST: ICD-10-CM

## 2019-08-20 PROCEDURE — 25010000002 PROPOFOL 200 MG/20ML EMULSION: Performed by: NURSE ANESTHETIST, CERTIFIED REGISTERED

## 2019-08-20 RX ORDER — SIMETHICONE 20 MG/.3ML
EMULSION ORAL AS NEEDED
Status: DISCONTINUED | OUTPATIENT
Start: 2019-08-20 | End: 2019-08-20 | Stop reason: HOSPADM

## 2019-08-20 RX ORDER — SODIUM CHLORIDE 0.9 % (FLUSH) 0.9 %
3 SYRINGE (ML) INJECTION AS NEEDED
Status: DISCONTINUED | OUTPATIENT
Start: 2019-08-20 | End: 2019-08-20 | Stop reason: HOSPADM

## 2019-08-20 RX ORDER — ONDANSETRON 2 MG/ML
4 INJECTION INTRAMUSCULAR; INTRAVENOUS ONCE AS NEEDED
Status: DISCONTINUED | OUTPATIENT
Start: 2019-08-20 | End: 2019-08-20 | Stop reason: HOSPADM

## 2019-08-20 RX ORDER — SODIUM CHLORIDE, SODIUM LACTATE, POTASSIUM CHLORIDE, CALCIUM CHLORIDE 600; 310; 30; 20 MG/100ML; MG/100ML; MG/100ML; MG/100ML
1000 INJECTION, SOLUTION INTRAVENOUS CONTINUOUS
Status: DISCONTINUED | OUTPATIENT
Start: 2019-08-20 | End: 2019-08-20 | Stop reason: HOSPADM

## 2019-08-20 RX ORDER — PROPOFOL 10 MG/ML
INJECTION, EMULSION INTRAVENOUS AS NEEDED
Status: DISCONTINUED | OUTPATIENT
Start: 2019-08-20 | End: 2019-08-20 | Stop reason: SURG

## 2019-08-20 RX ADMIN — PROPOFOL 50 MG: 10 INJECTION, EMULSION INTRAVENOUS at 12:58

## 2019-08-20 RX ADMIN — PROPOFOL 100 MG: 10 INJECTION, EMULSION INTRAVENOUS at 13:05

## 2019-08-20 RX ADMIN — PROPOFOL 50 MG: 10 INJECTION, EMULSION INTRAVENOUS at 13:12

## 2019-08-20 RX ADMIN — PROPOFOL 50 MG: 10 INJECTION, EMULSION INTRAVENOUS at 12:55

## 2019-08-20 RX ADMIN — SODIUM CHLORIDE, POTASSIUM CHLORIDE, SODIUM LACTATE AND CALCIUM CHLORIDE 1000 ML: 600; 310; 30; 20 INJECTION, SOLUTION INTRAVENOUS at 11:04

## 2019-08-20 RX ADMIN — PROPOFOL 125 MG: 10 INJECTION, EMULSION INTRAVENOUS at 12:50

## 2019-08-20 RX ADMIN — PROPOFOL 50 MG: 10 INJECTION, EMULSION INTRAVENOUS at 13:01

## 2019-08-20 RX ADMIN — PROPOFOL 50 MG: 10 INJECTION, EMULSION INTRAVENOUS at 13:08

## 2019-08-20 NOTE — ANESTHESIA POSTPROCEDURE EVALUATION
Patient: Yovany Gonzalez    Procedure Summary     Date:  08/20/19 Room / Location:  [facility] ENDOSCOPY 2 / [facility] ENDOSCOPY    Anesthesia Start:  1251 Anesthesia Stop:  1315    Procedure:  COLONOSCOPY W/ COLD SNARE POLYPECTOMY (N/A Anus) Diagnosis:       Positive colorectal cancer screening using Cologuard test      (Positive colorectal cancer screening using Cologuard test [R19.5])    Surgeon:  Elmo Sanchez MD Provider:  Masood Alvarado CRNA    Anesthesia Type:  MAC ASA Status:  2          Anesthesia Type: MAC  Last vitals  BP   150/94 (08/20/19 1057)   Temp   98.2 °F (36.8 °C) (08/20/19 1057)   Pulse   61 (08/20/19 1057)   Resp   18 (08/20/19 1057)     SpO2   99 % (08/20/19 1057)     Post Anesthesia Care and Evaluation    Patient location during evaluation: bedside  Patient participation: complete - patient participated  Level of consciousness: awake and alert  Pain score: 0  Pain management: adequate  Airway patency: patent  Anesthetic complications: No anesthetic complications  PONV Status: none  Cardiovascular status: acceptable  Respiratory status: acceptable  Hydration status: acceptable

## 2019-08-20 NOTE — DISCHARGE INSTRUCTIONS
Please follow all post op instructions and follow up appointment time from your physician's office included in your discharge packet.    No pushing, pulling, tugging,  heavy lifting, or strenuous activity.  No major decision making, driving, or drinking alcoholic beverages for 24 hours. ( due to the medications you have  received)  Always use good hand hygiene/washing techniques.  NO driving while taking pain medications.    To assist you in voiding:  Drink plenty of fluids  Listen to running water while attempting to void.    If you are unable to urinate and you have an uncomfortable urge to void or it has been   6 hours since you were discharged, return to the Emergency Room

## 2019-08-20 NOTE — ANESTHESIA PREPROCEDURE EVALUATION
Anesthesia Evaluation     Patient summary reviewed and Nursing notes reviewed   NPO Solid Status: > 8 hours  NPO Liquid Status: > 8 hours           Airway   Mallampati: II  TM distance: >3 FB  Neck ROM: full  No difficulty expected  Dental      Pulmonary    Cardiovascular   Exercise tolerance: good (4-7 METS)    (+) hyperlipidemia,       Neuro/Psych  GI/Hepatic/Renal/Endo      Musculoskeletal     Abdominal    Substance History      OB/GYN          Other                        Anesthesia Plan    ASA 2     MAC     intravenous induction   Anesthetic plan, all risks, benefits, and alternatives have been provided, discussed and informed consent has been obtained with: patient.    Plan discussed with CRNA.

## 2019-08-23 LAB
LAB AP CASE REPORT: NORMAL
PATH REPORT.FINAL DX SPEC: NORMAL

## 2019-11-15 DIAGNOSIS — E23.6 ACTH DEFICIENCY (HCC): ICD-10-CM

## 2019-11-15 RX ORDER — PREDNISONE 1 MG/1
TABLET ORAL
Qty: 90 TABLET | Refills: 3 | Status: SHIPPED | OUTPATIENT
Start: 2019-11-15 | End: 2020-11-19 | Stop reason: SDUPTHER

## 2020-11-19 DIAGNOSIS — E23.6 ACTH DEFICIENCY (HCC): ICD-10-CM

## 2020-11-19 NOTE — TELEPHONE ENCOUNTER
Caller: Yovany Gonzalez    Relationship: Self    Best call back number: [phone]    Medication needed:   Requested Prescriptions     Pending Prescriptions Disp Refills   • predniSONE (DELTASONE) 5 MG tablet 90 tablet 3     Sig: Take 1 tablet by mouth Daily.       When do you need the refill by: 11/20/20    What details did the patient provide when requesting the medication: Patient states that he has 8 pills left and that will not last until 12/17/20 appointment.  Patient is asking for something temporary until then or a renewal for a year.    Does the patient have less than a 3 day supply:  [] Yes  [x] No    What is the patient's preferred pharmacy: [id number] - [place], [place] [facility] [address] [phone] [facility] [phone] FX

## 2020-11-20 RX ORDER — PREDNISONE 1 MG/1
5 TABLET ORAL DAILY
Qty: 90 TABLET | Refills: 0 | Status: SHIPPED | OUTPATIENT
Start: 2020-11-20 | End: 2020-12-17 | Stop reason: SDUPTHER

## 2020-12-17 ENCOUNTER — RESULTS ENCOUNTER (OUTPATIENT)
Dept: INTERNAL MEDICINE | Facility: CLINIC | Age: 65
End: 2020-12-17

## 2020-12-17 ENCOUNTER — OFFICE VISIT (OUTPATIENT)
Dept: INTERNAL MEDICINE | Facility: CLINIC | Age: 65
End: 2020-12-17

## 2020-12-17 VITALS
WEIGHT: 220.6 LBS | RESPIRATION RATE: 18 BRPM | HEART RATE: 63 BPM | DIASTOLIC BLOOD PRESSURE: 76 MMHG | TEMPERATURE: 97.4 F | OXYGEN SATURATION: 100 % | SYSTOLIC BLOOD PRESSURE: 148 MMHG | BODY MASS INDEX: 30.88 KG/M2 | HEIGHT: 71 IN

## 2020-12-17 DIAGNOSIS — Z11.59 NEED FOR HEPATITIS C SCREENING TEST: ICD-10-CM

## 2020-12-17 DIAGNOSIS — Z79.52 LONG TERM SYSTEMIC STEROID USER: ICD-10-CM

## 2020-12-17 DIAGNOSIS — Z12.5 PROSTATE CANCER SCREENING: ICD-10-CM

## 2020-12-17 DIAGNOSIS — E23.6 ACTH DEFICIENCY (HCC): ICD-10-CM

## 2020-12-17 DIAGNOSIS — Z72.0 TOBACCO USE: ICD-10-CM

## 2020-12-17 DIAGNOSIS — R53.83 FATIGUE, UNSPECIFIED TYPE: ICD-10-CM

## 2020-12-17 DIAGNOSIS — Z13.29 THYROID DISORDER SCREEN: ICD-10-CM

## 2020-12-17 DIAGNOSIS — E78.5 HYPERLIPIDEMIA LDL GOAL <130: Chronic | ICD-10-CM

## 2020-12-17 DIAGNOSIS — Z00.00 ANNUAL PHYSICAL EXAM: Primary | ICD-10-CM

## 2020-12-17 PROBLEM — D36.9 TUBULAR ADENOMA: Status: ACTIVE | Noted: 2020-12-17

## 2020-12-17 PROBLEM — R19.5 POSITIVE COLORECTAL CANCER SCREENING USING COLOGUARD TEST: Status: RESOLVED | Noted: 2019-08-09 | Resolved: 2020-12-17

## 2020-12-17 PROCEDURE — 99396 PREV VISIT EST AGE 40-64: CPT | Performed by: FAMILY MEDICINE

## 2020-12-17 RX ORDER — PREDNISONE 1 MG/1
5 TABLET ORAL DAILY
Qty: 90 TABLET | Refills: 3 | Status: SHIPPED | OUTPATIENT
Start: 2020-12-17 | End: 2021-11-23 | Stop reason: SDUPTHER

## 2020-12-17 NOTE — PATIENT INSTRUCTIONS
Health Maintenance, Male  A healthy lifestyle and preventive care is important for your health and wellness. Ask your health care provider about what schedule of regular examinations is right for you.  What should I know about weight and diet?    Eat a Healthy Diet  · Eat plenty of vegetables, fruits, whole grains, low-fat dairy products, and lean protein.  · Do not eat a lot of foods high in solid fats, added sugars, or salt.     Maintain a Healthy Weight  Regular exercise can help you achieve or maintain a healthy weight. You should:  · Do at least 150 minutes of exercise each week. The exercise should increase your heart rate and make you sweat (moderate-intensity exercise).  · Do strength-training exercises at least twice a week.     Watch Your Levels of Cholesterol and Blood Lipids  · Have your blood tested for lipids and cholesterol every 5 years starting at 35 years of age. If you are at high risk for heart disease, you should start having your blood tested when you are 20 years old. You may need to have your cholesterol levels checked more often if:  ? Your lipid or cholesterol levels are high.  ? You are older than 50 years of age.  ? You are at high risk for heart disease.     What should I know about cancer screening?  Many types of cancers can be detected early and may often be prevented.  Lung Cancer  · You should be screened every year for lung cancer if:  ? You are a current smoker who has smoked for at least 30 years.  ? You are a former smoker who has quit within the past 15 years.  · Talk to your health care provider about your screening options, when you should start screening, and how often you should be screened.     Colorectal Cancer  · Routine colorectal cancer screening usually begins at 50 years of age and should be repeated every 5-10 years until you are 75 years old. You may need to be screened more often if early forms of precancerous polyps or small growths are found. Your health care  provider may recommend screening at an earlier age if you have risk factors for colon cancer.  · Your health care provider may recommend using home test kits to check for hidden blood in the stool.  · A small camera at the end of a tube can be used to examine your colon (sigmoidoscopy or colonoscopy). This checks for the earliest forms of colorectal cancer.     Prostate and Testicular Cancer  · Depending on your age and overall health, your health care provider may do certain tests to screen for prostate and testicular cancer.  · Talk to your health care provider about any symptoms or concerns you have about testicular or prostate cancer.     Skin Cancer  · Check your skin from head to toe regularly.  · Tell your health care provider about any new moles or changes in moles, especially if:  ? There is a change in a mole’s size, shape, or color.  ? You have a mole that is larger than a pencil eraser.  · Always use sunscreen. Apply sunscreen liberally and repeat throughout the day.  · Protect yourself by wearing long sleeves, pants, a wide-brimmed hat, and sunglasses when outside.     What should I know about heart disease, diabetes, and high blood pressure?  · If you are 18-39 years of age, have your blood pressure checked every 3-5 years. If you are 40 years of age or older, have your blood pressure checked every year. You should have your blood pressure measured twice--once when you are at a hospital or clinic, and once when you are not at a hospital or clinic. Record the average of the two measurements. To check your blood pressure when you are not at a hospital or clinic, you can use:  ? An automated blood pressure machine at a pharmacy.  ? A home blood pressure monitor.  · Talk to your health care provider about your target blood pressure.  · If you are between 45-79 years old, ask your health care provider if you should take aspirin to prevent heart disease.  · Have regular diabetes screenings by checking your  fasting blood sugar level.  ? If you are at a normal weight and have a low risk for diabetes, have this test once every three years after the age of 45.  ? If you are overweight and have a high risk for diabetes, consider being tested at a younger age or more often.  · A one-time screening for abdominal aortic aneurysm (AAA) by ultrasound is recommended for men aged 65-75 years who are current or former smokers.  What should I know about preventing infection?  Hepatitis B  If you have a higher risk for hepatitis B, you should be screened for this virus. Talk with your health care provider to find out if you are at risk for hepatitis B infection.  Hepatitis C  Blood testing is recommended for:  · Everyone born from 1945 through 1965.  · Anyone with known risk factors for hepatitis C.     Sexually Transmitted Diseases (STDs)  · You should be screened each year for STDs including gonorrhea and chlamydia if:  ? You are sexually active and are younger than 24 years of age.  ? You are older than 24 years of age and your health care provider tells you that you are at risk for this type of infection.  ? Your sexual activity has changed since you were last screened and you are at an increased risk for chlamydia or gonorrhea. Ask your health care provider if you are at risk.  · Talk with your health care provider about whether you are at high risk of being infected with HIV. Your health care provider may recommend a prescription medicine to help prevent HIV infection.     What else can I do?  ·   · Schedule regular health, dental, and eye exams.  · Stay current with your vaccines (immunizations).  · Do not use any tobacco products, such as cigarettes, chewing tobacco, and e-cigarettes. If you need help quitting, ask your health care provider.  · Limit alcohol intake to no more than 2 drinks per day. One drink equals 12 ounces of beer, 5 ounces of wine, or 1½ ounces of hard liquor.  · Do not use street drugs.  · Do not share  needles.  · Ask your health care provider for help if you need support or information about quitting drugs.  · Tell your health care provider if you often feel depressed.  · Tell your health care provider if you have ever been abused or do not feel safe at home.      This information is not intended to replace advice given to you by your health care provider. Make sure you discuss any questions you have with your health care provider.  Document Released: 06/15/2009 Document Revised: 08/16/2017 Document Reviewed: 09/20/2016  Elsevier Interactive Patient Education © 2018 [employer]       Mediterranean Diet  A Mediterranean diet refers to food and lifestyle choices that are based on the traditions of countries located on the [place]. This way of eating has been shown to help prevent certain conditions and improve outcomes for people who have chronic diseases, like kidney disease and heart disease.  What are tips for following this plan?  Lifestyle  · Cook and eat meals together with your family, when possible.  · Drink enough fluid to keep your urine clear or pale yellow.  · Be physically active every day. This includes:  ? Aerobic exercise like running or swimming.  ? Leisure activities like gardening, walking, or housework.  · Get 7-8 hours of sleep each night.  · If recommended by your health care provider, drink red wine in moderation. This means 1 glass a day for nonpregnant women and 2 glasses a day for men. A glass of wine equals 5 oz (150 mL).  Reading food labels    · Check the serving size of packaged foods. For foods such as rice and pasta, the serving size refers to the amount of cooked product, not dry.  · Check the total fat in packaged foods. Avoid foods that have saturated fat or trans fats.  · Check the ingredients list for added sugars, such as corn syrup.  Shopping  · At the grocery store, buy most of your food from the areas near the walls of the store. This includes:  ? Fresh fruits  and vegetables (produce).  ? Grains, beans, nuts, and seeds. Some of these may be available in unpackaged forms or large amounts (in bulk).  ? Fresh seafood.  ? Poultry and eggs.  ? Low-fat dairy products.  · Buy whole ingredients instead of prepackaged foods.  · Buy fresh fruits and vegetables in-season from local farmers markets.  · Buy frozen fruits and vegetables in resealable bags.  · If you do not have access to quality fresh seafood, buy precooked frozen shrimp or canned fish, such as tuna, salmon, or sardines.  · Buy small amounts of raw or cooked vegetables, salads, or olives from the deli or salad bar at your store.  · Stock your pantry so you always have certain foods on hand, such as olive oil, canned tuna, canned tomatoes, rice, pasta, and beans.  Cooking  · Cook foods with extra-virgin olive oil instead of using butter or other vegetable oils.  · Have meat as a side dish, and have vegetables or grains as your main dish. This means having meat in small portions or adding small amounts of meat to foods like pasta or stew.  · Use beans or vegetables instead of meat in common dishes like chili or lasagna.  · [place] with different cooking methods. Try roasting or broiling vegetables instead of steaming or sautéeing them.  · Add frozen vegetables to soups, stews, pasta, or rice.  · Add nuts or seeds for added healthy fat at each meal. You can add these to yogurt, salads, or vegetable dishes.  · Marinate fish or vegetables using olive oil, lemon juice, garlic, and fresh herbs.  Meal planning    · Plan to eat 1 vegetarian meal one day each week. Try to work up to 2 vegetarian meals, if possible.  · Eat seafood 2 or more times a week.  · Have healthy snacks readily available, such as:  ? Vegetable sticks with hummus.  ? Greek yogurt.  ? Fruit and nut trail mix.  · Eat balanced meals throughout the week. This includes:  ? Fruit: 2-3 servings a day  ? Vegetables: 4-5 servings a day  ? Low-fat dairy: 2  servings a day  ? Fish, poultry, or lean meat: 1 serving a day  ? Beans and legumes: 2 or more servings a week  ? Nuts and seeds: 1-2 servings a day  ? Whole grains: 6-8 servings a day  ? Extra-virgin olive oil: 3-4 servings a day  · Limit red meat and sweets to only a few servings a month  What are my food choices?  · Mediterranean diet  ? Recommended  § Grains: Whole-grain pasta. Brown rice. Bulgar wheat. Polenta. Couscous. Whole-wheat bread. Oatmeal. Quinoa.  § Vegetables: Artichokes. Beets. Broccoli. Cabbage. Carrots. Eggplant. Green beans. Chard. Kale. Spinach. Onions. Leeks. Peas. Squash. Tomatoes. Peppers. Radishes.  § Fruits: Apples. Apricots. Avocado. Berries. Bananas. Cherries. Dates. Figs. Grapes. [place]. Melon. Oranges. Peaches. Plums. Pomegranate.  § Meats and other protein foods: Beans. Almonds. Sunflower seeds. Pine nuts. Peanuts. Cod. [place]. Scallops. Shrimp. Tuna. Tilapia. Clams. Oysters. Eggs.  § Dairy: Low-fat milk. Cheese. Greek yogurt.  § Beverages: Water. Red wine. Herbal tea.  § Fats and oils: Extra virgin olive oil. Avocado oil. Grape seed oil.  § Sweets and desserts: Greek yogurt with honey. Baked apples. Poached pears. Trail mix.  § Seasoning and other foods: Basil. Cilantro. Coriander. Cumin. Mint. Parsley. [place]. Rosemary. Tarragon. Garlic. Oregano. Thyme. Pepper. Balsalmic vinegar. Tahini. Hummus. Tomato sauce. Olives. Mushrooms.  ? Limit these  § Grains: Prepackaged pasta or rice dishes. Prepackaged cereal with added sugar.  § Vegetables: Deep fried potatoes (french fries).  § Fruits: Fruit canned in syrup.  § Meats and other protein foods: Beef. Pork. Lamb. Poultry with skin. Hot dogs. [place].  § Dairy: Ice cream. Sour cream. Whole milk.  § Beverages: Juice. Sugar-sweetened soft drinks. Beer. Liquor and spirits.  § Fats and oils: Butter. Canola oil. Vegetable oil. Beef fat (tallow). Lard.  § Sweets and desserts: Cookies. Cakes. Pies. Candy.  § Seasoning and other foods: [facility].  Premade sauces and marinades.  The items listed may not be a complete list. Talk with your dietitian about what dietary choices are right for you.  Summary  · The Mediterranean diet includes both food and lifestyle choices.  · Eat a variety of fresh fruits and vegetables, beans, nuts, seeds, and whole grains.  · Limit the amount of red meat and sweets that you eat.  · Talk with your health care provider about whether it is safe for you to drink red wine in moderation. This means 1 glass a day for nonpregnant women and 2 glasses a day for men. A glass of wine equals 5 oz (150 mL).  This information is not intended to replace advice given to you by your health care provider. Make sure you discuss any questions you have with your health care provider.  Document Revised: 08/17/2017 Document Reviewed: 08/10/2017  [employer] Patient Education © 2020 [employer]        IF YOU SMOKE OR USE TOBACCO PLEASE READ THE FOLLOWING:  Why is smoking bad for me?  Smoking increases the risk of heart disease, lung disease, vascular disease, stroke, and cancer. If you smoke, STOP!    For more information:  Quit Now [place]  1-800-QUIT-NOW  https://[email or web address]/en-[place]/    Repeat colonoscopy in 2024 as tubular adenoma detected on 2019 colonoscopy, 5 years per general surgeon.    Monitor blood pressure at home. Goal is to be under 130/80. If you are running higher than this please let us know, we may need to start blood pressure medicine.    Suggest Pneumovax 23 (pneumonia shot) at age 65; one needed at age 65 and up.

## 2020-12-17 NOTE — PROGRESS NOTES
"12/17/2020  Chief Complaint   Patient presents with   • Annual Exam     physical       Yovany Gonzalez is here for his annual preventive exam. History per MA reviewed.   Followed by [facility] for acth deficiency on daily steroid. Up to date DEXA scan, they did, and up to date dental exams (2x a year). Up to date eye exam wears \"cheaters\". Retired, naps more, overall no new issues/complaints. Has to do labs at [facility] [place] as they're free that way with his insurance. Would like prostate cancer screening, strong family history.    Yovany Gonzalez has the following medical issues:  Patient Active Problem List    Diagnosis   • Tubular adenoma [D36.9]   • Hyperlipidemia LDL goal <130 [E78.5]   • ACTH deficiency (CMS/HCC) [E23.6]       Health Maintenance   Topic Date Due   • HEPATITIS C SCREENING  10/04/2016   • LIPID PANEL  12/26/2018   • ANNUAL PHYSICAL  11/15/2019   • TDAP/TD VACCINES (1 - Tdap) 12/01/2021 (Originally 1/4/1974)   • ZOSTER VACCINE (1 of 2) 12/26/2021 (Originally 1/4/2006)   • COLOGUARD  12/17/2023   • COLONOSCOPY  08/20/2024   • INFLUENZA VACCINE  Completed   • Pneumococcal Vaccine 0-64  Discontinued       Immunization History   Administered Date(s) Administered   • Flu Vaccine Quad PF >18YRS 10/12/2016   • Flu Vaccine Quad PF >36MO 10/16/2017   • Flulaval/Fluarix/Fluzone Quad 10/30/2015, 10/06/2020   • Influenza, Unspecified 11/01/2019   • flucelvax quad pfs =>4 YRS 10/15/2018       Review of Systems   Constitutional: Positive for activity change (retired; naps more) and fatigue. Negative for fever.   Respiratory: Negative for shortness of breath.    Cardiovascular: Positive for palpitations (very brief racing sometimes, no pain ). Negative for chest pain.   Gastrointestinal: Negative for abdominal pain.   All other systems reviewed and are negative.      The following portions of the patient's history were reviewed and updated as appropriate: allergies, current medications, past family history, past " "medical history, past social history, past surgical history and problem list.    Objective   Visit Vitals  /76   Pulse 63   Temp 97.4 °F (36.3 °C)   Resp 18   Ht 180.3 cm (71\")   Wt 100 kg (220 lb 9.6 oz)   SpO2 100%   BMI 30.77 kg/m²        Physical Exam  Vitals signs and nursing note reviewed.   Constitutional:       General: He is not in acute distress.     Appearance: Normal appearance. He is well-developed and well-groomed. He is obese. He is not ill-appearing, toxic-appearing or diaphoretic.      Interventions: Face mask in place.   HENT:      Head: Normocephalic and atraumatic.      Right Ear: Hearing, tympanic membrane, ear canal and external ear normal.      Left Ear: Hearing, tympanic membrane, ear canal and external ear normal.   Eyes:      General: Lids are normal. Gaze aligned appropriately. No scleral icterus.        Right eye: No discharge.         Left eye: No discharge.      Extraocular Movements: Extraocular movements intact.      Conjunctiva/sclera: Conjunctivae normal.      Pupils: Pupils are equal, round, and reactive to light.   Neck:      Musculoskeletal: Neck supple.      Thyroid: No thyromegaly.      Trachea: Phonation normal.   Cardiovascular:      Rate and Rhythm: Normal rate and regular rhythm.      Heart sounds: Normal heart sounds.   Pulmonary:      Effort: Pulmonary effort is normal.      Breath sounds: Normal breath sounds and air entry.   Chest:      Chest wall: No tenderness.   Abdominal:      General: Bowel sounds are normal. There is no distension.      Palpations: Abdomen is soft. Abdomen is not rigid. There is no mass.      Tenderness: There is no abdominal tenderness. There is no guarding or rebound.   Musculoskeletal:         General: No deformity.   Lymphadenopathy:      Cervical: No cervical adenopathy.   Skin:     General: Skin is warm.      Capillary Refill: Capillary refill takes less than 2 seconds.      Coloration: Skin is not cyanotic, jaundiced or pale.      " Findings: No rash.   Neurological:      General: No focal deficit present.      Mental Status: He is alert and oriented to person, place, and time. Mental status is at baseline.      Cranial Nerves: No dysarthria.      Motor: No tremor, abnormal muscle tone or seizure activity.      Gait: Gait is intact.   Psychiatric:         Attention and Perception: Attention and perception normal.         Mood and Affect: Mood and affect normal.         Speech: Speech normal.         Behavior: Behavior normal. Behavior is cooperative.         Thought Content: Thought content normal.         Cognition and Memory: Cognition and memory normal.         Judgment: Judgment normal.         Lab Results   Component Value Date    CHLPL 238 (H) 12/26/2017    TRIG 94 12/26/2017    HDL 53 12/26/2017     (H) 12/26/2017    LDLDIRECT 32 12/26/2017     Lab Results   Component Value Date    TSH 2.760 12/26/2017     Lab Results   Component Value Date    FREET4 1.00 12/26/2017     Lab Results   Component Value Date    HGBA1C 5.30 12/26/2017       Assessment     Problem List Items Addressed This Visit        Cardiovascular and Mediastinum    Hyperlipidemia LDL goal <130 (Chronic)    Relevant Orders    Lipid Panel       Endocrine    ACTH deficiency (CMS/HCC)    Relevant Medications    predniSONE (DELTASONE) 5 MG tablet      Other Visit Diagnoses     Annual physical exam    -  Primary    Tobacco use        Fatigue, unspecified type        Relevant Orders    Vitamin B12    Folate    Comprehensive Metabolic Panel    CBC (No Diff)    TSH+Free T4    Vitamin D 25 Hydroxy    Need for hepatitis C screening test        Relevant Orders    Hepatitis C Antibody    Long term systemic steroid user        Relevant Medications    predniSONE (DELTASONE) 5 MG tablet    Other Relevant Orders    Hemoglobin A1c    Thyroid disorder screen        Relevant Orders    TSH+Free T4    Prostate cancer screening        Relevant Orders    PSA Screen          · Health  maintenance information provided with patient plan.   · Counseled on age appropriate health screenings.  · Immunizations for age discussed, encouraged. Td/tdap if sustains injury. Shingrix @ pharmacy, avoid Zostavax and not needed (info given)  · Encourage healthy habits such as exercise, healthy diet.  Patient's Body mass index is 30.77 kg/m². BMI is above normal parameters. Recommendations include: educational material.  Return in about 1 year (around 12/17/2021) for Annual physical.   · Per avs:  Repeat colonoscopy in 2024 as tubular adenoma detected on 2019 colonoscopy, 5 years per general surgeon.    Monitor blood pressure at home. Goal is to be under 130/80. If you are running higher than this please let us know, we may need to start blood pressure medicine.    Suggest Pneumovax 23 (pneumonia shot) at age 65; one needed at age 65 and up.    Fernanda Ewing MD

## 2021-01-06 ENCOUNTER — TELEPHONE (OUTPATIENT)
Dept: INTERNAL MEDICINE | Facility: CLINIC | Age: 66
End: 2021-01-06

## 2021-01-06 DIAGNOSIS — E78.01 FAMILIAL HYPERCHOLESTEREMIA: Primary | ICD-10-CM

## 2021-01-06 DIAGNOSIS — E55.9 VITAMIN D DEFICIENCY: ICD-10-CM

## 2021-01-06 LAB
25(OH)D3 SERPL-MCNC: 23 NG/ML (ref 30–100)
ALBUMIN SERPL-MCNC: 4.4 G/DL (ref 3.5–5.2)
ALP SERPL-CCNC: 64 U/L
ALT SERPL W P-5'-P-CCNC: 43 U/L (ref 1–41)
AST SERPL-CCNC: 33 U/L (ref 1–40)
BUN BLD-MCNC: 23 MG/DL
CALCIUM SPEC-SCNC: 9.4 MG/DL (ref 8.6–10.5)
CHLORIDE BLD-SCNC: 105 MMOL/L
CHOLEST SERPL-MCNC: 304 MG/DL (ref ?–200)
CO2 SERPL-SCNC: 30 MMOL/L
CREAT UR-MCNC: 1.08 MG/DL (ref 0.6–1.3)
EST GFR BY CLEARANCE: 72 ML/MIN
EXTERNAL HEMATOCRIT: 43 %
EXTERNAL THYROID STIMULATING HORMONE: 2.08 M[IU]/ML
FOLATE SERPL-MCNC: 18.5 NG/ML (ref 4.78–24.2)
GLUCOSE BLD-MCNC: 87 MG/DL
HBA1C MFR BLD: 5.3 %
HCV AB SER DONR QL: NORMAL
HDLC SERPL-MCNC: 58 MG/DL (ref 40–60)
HGB BLD-MCNC: 14.8 G/DL
LDLC SERPL DIRECT ASSAY-MCNC: 224 MG/DL
PLATELETS: 212
POTASSIUM BLD-SCNC: 4 MMOL/L
PROTEIN: 6.9
PSA SERPL-MCNC: 1 NG/ML (ref 0–4)
SODIUM BLD-SCNC: 141 MMOL/L
T4 FREE SERPL-MCNC: 1.1 NG/DL (ref 0.93–1.7)
TRIGL SERPL-MCNC: 101 MG/DL (ref 0–150)
VIT B12 BLD-MCNC: 461 PG/ML (ref 211–946)
WBC # BLD: 9.3 10*3/UL (ref 3.8–10.8)

## 2021-01-06 RX ORDER — ROSUVASTATIN CALCIUM 20 MG/1
20 TABLET, COATED ORAL NIGHTLY
Qty: 90 TABLET | Refills: 3 | Status: SHIPPED | OUTPATIENT
Start: 2021-01-06 | End: 2021-11-23

## 2021-01-06 NOTE — TELEPHONE ENCOUNTER
Spoke with pt and discussed. He is agreeable to trying Crestor and will  some Vitamin D3 to take.

## 2021-01-06 NOTE — TELEPHONE ENCOUNTER
Please call pt, let him know I've manually added his lab results in our system, received fax from outside lab. All normal except:    Vitamin D a bit low at 23. Needs to be taking 2000 IU of vitamin D3 over the counter daily and try to get 15 min of sun exposure to face/arms daily when possible.     Cholesterol is very high, suggestive of a familial hyperlipidemia (genetic). I suggest adding medicine to lower risk of stroke and vascular disease. Can add statin such as Crestor. The newer injection medicines (every 2 weeks, such as Repatha) are an option but insurance may require genetic test to prove genetic cause. If he wants a telehealth visit to discuss further that's okay, or if he is agreeable to a med I can call in. Would repeat fasting labs in 2-3 months in that case.    Hard copy of labs in my outbox for scan, if he'd like a copy.

## 2021-08-11 ENCOUNTER — TELEPHONE (OUTPATIENT)
Dept: INTERNAL MEDICINE | Facility: CLINIC | Age: 66
End: 2021-08-11

## 2021-08-11 DIAGNOSIS — E23.6 ACTH DEFICIENCY: Primary | ICD-10-CM

## 2021-08-19 ENCOUNTER — TELEPHONE (OUTPATIENT)
Dept: INTERNAL MEDICINE | Facility: CLINIC | Age: 66
End: 2021-08-19

## 2021-08-19 NOTE — TELEPHONE ENCOUNTER
Spoke with Azalia and she is faxing over orders for Dr. Ewing to looks at to see which lipoprotein lab is needed

## 2021-08-19 NOTE — TELEPHONE ENCOUNTER
marilynn with [facility] called to get clarification on lipoprotein phenotyping order. She needs a call today.

## 2021-08-24 ENCOUNTER — TELEPHONE (OUTPATIENT)
Dept: INTERNAL MEDICINE | Facility: CLINIC | Age: 66
End: 2021-08-24

## 2021-08-24 NOTE — TELEPHONE ENCOUNTER
Please mail copy of labs to patient for his records. Cholesterol looks okay. Sugar okay. Carbon dioxide somewhat elevated. Can see with breath holding, lung issues, such as sleep apnea. Suggest watching this lab over time, if any concerns for sleep apnea I can refer to sleep medicine. Vitamin D okay.

## 2021-09-24 ENCOUNTER — TRANSCRIBE ORDERS (OUTPATIENT)
Dept: LAB | Facility: HOSPITAL | Age: 66
End: 2021-09-24

## 2021-09-24 ENCOUNTER — LAB (OUTPATIENT)
Dept: LAB | Facility: HOSPITAL | Age: 66
End: 2021-09-24

## 2021-09-24 DIAGNOSIS — Z20.822 COVID-19 RULED OUT: Primary | ICD-10-CM

## 2021-09-24 DIAGNOSIS — Z20.822 COVID-19 RULED OUT: ICD-10-CM

## 2021-09-24 PROCEDURE — U0004 COV-19 TEST NON-CDC HGH THRU: HCPCS

## 2021-09-24 PROCEDURE — C9803 HOPD COVID-19 SPEC COLLECT: HCPCS

## 2021-09-25 LAB — SARS-COV-2 RNA NOSE QL NAA+PROBE: NOT DETECTED

## 2021-11-23 ENCOUNTER — OFFICE VISIT (OUTPATIENT)
Dept: ENDOCRINOLOGY | Facility: CLINIC | Age: 66
End: 2021-11-23

## 2021-11-23 VITALS
WEIGHT: 217 LBS | BODY MASS INDEX: 31.07 KG/M2 | SYSTOLIC BLOOD PRESSURE: 140 MMHG | OXYGEN SATURATION: 98 % | HEIGHT: 70 IN | HEART RATE: 88 BPM | DIASTOLIC BLOOD PRESSURE: 82 MMHG

## 2021-11-23 DIAGNOSIS — E78.01 FAMILIAL HYPERCHOLESTEREMIA: ICD-10-CM

## 2021-11-23 DIAGNOSIS — E27.49 SECONDARY ADRENAL INSUFFICIENCY (HCC): Primary | ICD-10-CM

## 2021-11-23 PROCEDURE — 99204 OFFICE O/P NEW MOD 45 MIN: CPT | Performed by: INTERNAL MEDICINE

## 2021-11-23 RX ORDER — PREDNISONE 1 MG/1
TABLET ORAL
Qty: 105 TABLET | Refills: 3 | Status: SHIPPED | OUTPATIENT
Start: 2021-11-23 | End: 2023-01-23

## 2021-11-23 RX ORDER — ROSUVASTATIN CALCIUM 20 MG/1
10 TABLET, COATED ORAL NIGHTLY
Qty: 90 TABLET | Refills: 3
Start: 2021-11-23 | End: 2022-04-25 | Stop reason: SDUPTHER

## 2022-01-28 ENCOUNTER — TELEPHONE (OUTPATIENT)
Dept: INTERNAL MEDICINE | Facility: CLINIC | Age: 67
End: 2022-01-28

## 2022-01-28 NOTE — TELEPHONE ENCOUNTER
Caller: Rajwinder Hodgson    Relationship: Emergency Contact    Best call back number: [phone]    Additional notes: PATIENT HAS CHOSEN TO SELF DISMISS AND TRANSFER PRACTICES TO INTERNAL MEDICINE [facility]. PATIENT'S WIFE STATES PATIENT WOULD BE MORE COMFORTABLE SEEING AN INTERNAL MEDICAINE DOCTOR AS OPPOSED TO SEEING A PEDIATRICIAN. PATIENT'S WIFE DID MENTION DR. LYLES WAS VERY NICE BUT PATIENT WOULD BE MORE COMFORTABLE SEEING SOMEONE WHO DEALS PRIMARILY WITH ADULT PATIENTS.

## 2022-02-08 ENCOUNTER — OFFICE VISIT (OUTPATIENT)
Dept: INTERNAL MEDICINE | Facility: CLINIC | Age: 67
End: 2022-02-08

## 2022-02-08 VITALS
HEIGHT: 69 IN | HEART RATE: 60 BPM | OXYGEN SATURATION: 99 % | SYSTOLIC BLOOD PRESSURE: 182 MMHG | DIASTOLIC BLOOD PRESSURE: 102 MMHG | WEIGHT: 216.6 LBS | BODY MASS INDEX: 32.08 KG/M2 | TEMPERATURE: 98.4 F

## 2022-02-08 DIAGNOSIS — D12.6 TUBULAR ADENOMA OF COLON: ICD-10-CM

## 2022-02-08 DIAGNOSIS — Z12.2 ENCOUNTER FOR SCREENING FOR LUNG CANCER: ICD-10-CM

## 2022-02-08 DIAGNOSIS — E23.6 ACTH DEFICIENCY: Primary | Chronic | ICD-10-CM

## 2022-02-08 DIAGNOSIS — Z23 NEED FOR VACCINATION WITH 13-POLYVALENT PNEUMOCOCCAL CONJUGATE VACCINE: ICD-10-CM

## 2022-02-08 DIAGNOSIS — F17.211 NICOTINE DEPENDENCE, CIGARETTES, IN REMISSION: ICD-10-CM

## 2022-02-08 DIAGNOSIS — Z12.5 SCREENING PSA (PROSTATE SPECIFIC ANTIGEN): ICD-10-CM

## 2022-02-08 DIAGNOSIS — I10 ESSENTIAL HYPERTENSION: Chronic | ICD-10-CM

## 2022-02-08 DIAGNOSIS — E78.5 HYPERLIPIDEMIA LDL GOAL <130: ICD-10-CM

## 2022-02-08 DIAGNOSIS — E27.49 SECONDARY ADRENAL INSUFFICIENCY: Chronic | ICD-10-CM

## 2022-02-08 PROBLEM — E23.0 ACTH DEFICIENCY: Status: ACTIVE | Noted: 2019-05-16

## 2022-02-08 PROBLEM — H52.4 BILATERAL PRESBYOPIA: Status: ACTIVE | Noted: 2020-07-02

## 2022-02-08 PROBLEM — E23.0 ACTH DEFICIENCY: Chronic | Status: ACTIVE | Noted: 2019-05-16

## 2022-02-08 PROBLEM — H25.13 CATARACT, NUCLEAR SCLEROTIC, BOTH EYES: Status: ACTIVE | Noted: 2019-05-16

## 2022-02-08 PROCEDURE — G0009 ADMIN PNEUMOCOCCAL VACCINE: HCPCS | Performed by: STUDENT IN AN ORGANIZED HEALTH CARE EDUCATION/TRAINING PROGRAM

## 2022-02-08 PROCEDURE — 90670 PCV13 VACCINE IM: CPT | Performed by: STUDENT IN AN ORGANIZED HEALTH CARE EDUCATION/TRAINING PROGRAM

## 2022-02-08 PROCEDURE — 99215 OFFICE O/P EST HI 40 MIN: CPT | Performed by: STUDENT IN AN ORGANIZED HEALTH CARE EDUCATION/TRAINING PROGRAM

## 2022-02-08 RX ORDER — NIFEDIPINE 30 MG/1
30 TABLET, EXTENDED RELEASE ORAL DAILY
Qty: 30 TABLET | Refills: 1 | Status: SHIPPED | OUTPATIENT
Start: 2022-02-08 | End: 2022-02-23 | Stop reason: DRUGHIGH

## 2022-02-08 NOTE — PROGRESS NOTES
"Chief Complaint  Yovany Gonzalez is a 67 y.o. male presenting for ACTH defiency (establish [facility] ).     From [place]. Moved to [place] 2013. Worked as  in the past, retired 2018. . No children.    Patient has a past medical history of ACTH deficiency with secondary adrenal insufficiency, hypertension (2022), hyperlipidemia, colon adenoma, cataracts and presbyopia.    History of Present Illness  Patient is here to [facility], he wanted to follow-up at clinic not seeing pediatric patients.    Patient has longstanding history of ACTH deficiency/secondary adrenal insufficiency, which was worked up at [facility].  Initially he presented with longstanding severe fatigue, weight loss with weight 150 pounds, states he was sleeping up to 22 hours/day before he was diagnosed.  Once he started on prednisone he felt normal again.  He is following with endocrinology and is on prednisone 5 mg daily.    More recently he has had blood pressures that have been mildly elevated.  He does not check his blood pressures at home.    Patient has a longstanding history of smoking, he states he had smoked about 1 pack a day for 40 years on average, he quit a week ago, but has been smoking on and off.  He never did lung cancer screening and would like to do LDCT.    The following portions of the patient's history were reviewed and updated as appropriate: allergies, current medications, past family history, past medical history, past social history, past surgical history and problem list.    Objective  BP (!) 182/102 (BP Location: Left arm, Patient Position: Sitting, Cuff Size: Adult)   Pulse 60   Temp 98.4 °F (36.9 °C) (Temporal)   Ht 175.3 cm (69\")   Wt 98.2 kg (216 lb 9.6 oz)   SpO2 99%   BMI 31.99 kg/m²     Physical Exam  Vitals reviewed.   Constitutional:       Appearance: Normal appearance.   HENT:      Head: Normocephalic and atraumatic.      Nose: No congestion.   Eyes:      Extraocular Movements: " Extraocular movements intact.      Conjunctiva/sclera: Conjunctivae normal.   Cardiovascular:      Rate and Rhythm: Normal rate and regular rhythm.      Heart sounds: Normal heart sounds. No murmur heard.      Pulmonary:      Effort: Pulmonary effort is normal.      Breath sounds: Normal breath sounds.   Abdominal:      General: There is no distension.      Palpations: Abdomen is soft. There is no mass.      Tenderness: There is no abdominal tenderness.   Musculoskeletal:      Cervical back: Neck supple.      Right lower leg: No edema.      Left lower leg: No edema.   Skin:     General: Skin is warm and dry.   Neurological:      Mental Status: He is alert and oriented to person, place, and time. Mental status is at baseline.   Psychiatric:         Behavior: Behavior normal.         Thought Content: Thought content normal.         Assessment/Plan   1. ACTH deficiency (HCC)  2. Secondary adrenal insufficiency (HCC)  Continue follow-up with endocrinology, continue on prednisone 5 mg.  No concern for crisis at this time.    3. Essential hypertension  BP Readings from Last 3 Encounters:   02/08/22 (!) 182/102   11/23/21 140/82   12/17/20 148/76   Patient qualifies for diagnosis of hypertension.  I strongly recommend starting on medication.  Patient agreeable.  We will start on nifedipine 30 mg.  I have asked patient to send me a [employer] message in about a week with 3 days worth of home blood pressure values, patient agreeable.  Side effects reviewed.  - Comprehensive Metabolic Panel; Future  - NIFEdipine XL (PROCARDIA XL) 30 MG 24 hr tablet; Take 1 tablet by mouth Daily.  Dispense: 30 tablet; Refill: 1    4. Tubular adenoma of colon  Due for repeat colonoscopy 2024.    5. Hyperlipidemia LDL goal <130  Patient has been on Crestor 20 mg, but was experiencing muscle pain and reduced to 10 mg and tolerated well.  - Lipid Panel; Future    6. Nicotine dependence, cigarettes, in remission   7. Encounter for screening for lung  cancer  Discussed the pros and cons of screening, including false positives.  Patient would like to proceed with LDCT.  - CT chest low dose wo; Future    8. Screening PSA (prostate specific antigen)  Patient has had normal PSA in the past, would like to continue prostate cancer screening with PSA.  - PSA Screen; Future    9. Need for vaccination with 13-polyvalent pneumococcal conjugate vaccine  Administered today.  Second dose due in 1 year with Pneumovax.  - Pneumococcal Conjugate Vaccine 13-Valent Allugate Vaccine 13-Valent All    Total time spent on chart review, charting and face-to-face with patient 51 minutes.    Return in about 15 days (around 2/23/2022) for Medicare Wellness.    Future Appointments       Provider Department Center    2/14/2022 2:00 PM [facility] CT [address] [facility] CT AT [facility]    2/23/2022 12:45 PM Anthony Cramer MD [facility] INTERNAL MEDICINE [place]    5/23/2022 3:15 PM Xenia Bryson MD [facility] ENDOCRINOLOGY [place]            Anthony Cramer MD  Family Medicine  02/08/2022

## 2022-02-14 ENCOUNTER — HOSPITAL ENCOUNTER (OUTPATIENT)
Dept: CT IMAGING | Facility: HOSPITAL | Age: 67
Discharge: HOME | End: 2022-02-14
Admitting: STUDENT IN AN ORGANIZED HEALTH CARE EDUCATION/TRAINING PROGRAM

## 2022-02-14 DIAGNOSIS — Z12.2 ENCOUNTER FOR SCREENING FOR LUNG CANCER: ICD-10-CM

## 2022-02-14 DIAGNOSIS — F17.211 NICOTINE DEPENDENCE, CIGARETTES, IN REMISSION: ICD-10-CM

## 2022-02-14 PROBLEM — R91.8 MULTIPLE LUNG NODULES ON CT: Status: ACTIVE | Noted: 2022-02-14

## 2022-02-14 PROCEDURE — 71271 CT THORAX LUNG CANCER SCR C-: CPT

## 2022-02-23 ENCOUNTER — OFFICE VISIT (OUTPATIENT)
Dept: INTERNAL MEDICINE | Facility: CLINIC | Age: 67
End: 2022-02-23

## 2022-02-23 VITALS
SYSTOLIC BLOOD PRESSURE: 138 MMHG | TEMPERATURE: 98.2 F | HEIGHT: 69 IN | HEART RATE: 68 BPM | BODY MASS INDEX: 32.58 KG/M2 | DIASTOLIC BLOOD PRESSURE: 80 MMHG | WEIGHT: 220 LBS

## 2022-02-23 DIAGNOSIS — I10 ESSENTIAL HYPERTENSION: Primary | Chronic | ICD-10-CM

## 2022-02-23 DIAGNOSIS — I51.7 HEART ENLARGEMENT: ICD-10-CM

## 2022-02-23 DIAGNOSIS — R91.8 MULTIPLE LUNG NODULES ON CT: ICD-10-CM

## 2022-02-23 PROCEDURE — 99214 OFFICE O/P EST MOD 30 MIN: CPT | Performed by: STUDENT IN AN ORGANIZED HEALTH CARE EDUCATION/TRAINING PROGRAM

## 2022-02-23 RX ORDER — NIFEDIPINE 60 MG/1
60 TABLET, EXTENDED RELEASE ORAL DAILY
Qty: 30 TABLET | Refills: 3 | Status: SHIPPED | OUTPATIENT
Start: 2022-02-23 | End: 2022-04-25

## 2022-02-23 RX ORDER — ANTIOX #8/OM3/DHA/EPA/LUT/ZEAX 250-2.5 MG
1 CAPSULE ORAL 2 TIMES DAILY
COMMUNITY

## 2022-02-23 NOTE — PROGRESS NOTES
"Chief Complaint  Yovany Gonzalez is a 67 y.o. male presenting for adrenal insufficiency (follow up) and Hypertension.     From [place]. Moved to [place] 2013. Worked as  in the past, retired 2018. . No children.     Patient has a past medical history of ACTH deficiency with secondary adrenal insufficiency, hypertension (2022), hyperlipidemia, colon adenoma, cataracts and presbyopia.    History of Present Illness  Patient is here for follow-up after starting on nifedipine for hypertension.  He has tolerated the medication well.  He has brought in measurements of his blood pressures, see image.  His blood pressures are still a bit on the high end.    Patient also went for undergo CT scan for lung cancer screening, which showed pulmonary nodules and they recommended follow-up scan in 6 months.  Also showed mild enlargement of the heart.      The following portions of the patient's history were reviewed and updated as appropriate: allergies, current medications, past family history, past medical history, past social history, past surgical history and problem list.          Objective  /80 (BP Location: Right arm, Patient Position: Sitting, Cuff Size: Adult)   Pulse 68   Temp 98.2 °F (36.8 °C)   Ht 175.3 cm (69.02\")   Wt 99.8 kg (220 lb)   BMI 32.47 kg/m²     Physical Exam  Vitals reviewed.   Constitutional:       Appearance: Normal appearance.   HENT:      Nose: No congestion.   Eyes:      Extraocular Movements: Extraocular movements intact.      Conjunctiva/sclera: Conjunctivae normal.   Skin:     General: Skin is warm and dry.   Neurological:      Mental Status: He is alert and oriented to person, place, and time. Mental status is at baseline.   Psychiatric:         Behavior: Behavior normal.         Thought Content: Thought content normal.         Assessment/Plan   1. Essential hypertension  BP Readings from Last 3 Encounters:   02/23/22 138/80   02/08/22 (!) 182/102   11/23/21 " 140/82   Blood pressure is still not at goal, but close to goal.  We will increase nifedipine from 30 to 60 mg.    - NIFEdipine XL (PROCARDIA XL) 60 MG 24 hr tablet; Take 1 tablet by mouth Daily.  Dispense: 30 tablet; Refill: 3    2. Heart enlargement  His potentially enlarged could be related to hypertension.  Patient would like evaluation by cardiology.  No chest pain or shortness of breath.  - Ambulatory Referral to Cardiology    3. Multiple lung nodules on CT  Discussed the findings of nodules on his first LDCT scan.  It is recommended to repeat after 6 months in his case to rule out any growing lesions that would be concerning for lung cancer.  Patient is asymptomatic.    Total time spent on chart review, charting and face-to-face with patient 34 minutes.    Return in about 2 months (around 4/23/2022) for Medicare Wellness.    Future Appointments       Provider Department Center    4/25/2022 12:45 PM Anthony Cramer MD [facility] INTERNAL MEDICINE [place]    5/23/2022 3:15 PM Xenia Bryson MD [facility] ENDOCRINOLOGY [place]            Anthony Cramer MD  Family Medicine  02/23/2022

## 2022-02-24 ENCOUNTER — OFFICE VISIT (OUTPATIENT)
Dept: CARDIOLOGY | Facility: CLINIC | Age: 67
End: 2022-02-24

## 2022-02-24 VITALS
HEIGHT: 69 IN | OXYGEN SATURATION: 98 % | BODY MASS INDEX: 32.58 KG/M2 | WEIGHT: 220 LBS | HEART RATE: 82 BPM | SYSTOLIC BLOOD PRESSURE: 130 MMHG | DIASTOLIC BLOOD PRESSURE: 68 MMHG

## 2022-02-24 DIAGNOSIS — I51.7 CARDIOMEGALY: ICD-10-CM

## 2022-02-24 DIAGNOSIS — F17.200 CURRENT EVERY DAY SMOKER: ICD-10-CM

## 2022-02-24 DIAGNOSIS — E78.5 DYSLIPIDEMIA: ICD-10-CM

## 2022-02-24 DIAGNOSIS — I10 ESSENTIAL HYPERTENSION: Primary | Chronic | ICD-10-CM

## 2022-02-24 PROCEDURE — 99203 OFFICE O/P NEW LOW 30 MIN: CPT | Performed by: INTERNAL MEDICINE

## 2022-02-24 PROCEDURE — 93000 ELECTROCARDIOGRAM COMPLETE: CPT | Performed by: INTERNAL MEDICINE

## 2022-02-24 NOTE — PROGRESS NOTES
Subjective:     Encounter Date:02/24/2022      Patient ID: Yovany Gonzalez is a 67 y.o. male.    Chief Complaint: Cardiomegaly  HPI  This is a 67-year-old male patient who presents to cardiology clinic for evaluation of hypertension and cardiomegaly.  The patient indicates that he discontinued cigarette smoking approximately 3 weeks ago.  He underwent a low-dose CT scan of the chest as a screening measure for lung malignancy.  The radiologist had commented on cardiomegaly without any further specifications.  The patient has no history of congestive heart failure or valvular heart disease.  He has no signs or symptoms to suggest congestive heart failure.  The patient indicates he was recently discovered to have hypertension and started on low-dose long-acting nifedipine.  His blood pressure has come under good control.  He has a history of dyslipidemia and is tolerating high-dose, high-potency statin based cholesterol-lowering therapy without side effects.  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  This appears to be an isolated abnormality that has not affect other aspects of the pituitary gland.  He is under the care of both a local endocrinologist as well as an endocrinologist at the [facility].  He has not smoked cigarettes in the last 3 weeks.  He is asymptomatic from a cardiovascular perspective.  The following portions of the patient's history were reviewed and updated as appropriate: allergies, current medications, past family history, past medical history, past social history, past surgical history and problem  Review of Systems   Constitutional: Negative for chills, diaphoresis, fever, malaise/fatigue, weight gain and weight loss.   HENT: Negative for ear discharge, hearing loss, hoarse voice and nosebleeds.    Eyes: Negative for discharge, double vision, pain and photophobia.   Cardiovascular: Negative for  chest pain, claudication, cyanosis, dyspnea on exertion, irregular heartbeat, leg swelling, near-syncope, orthopnea, palpitations, paroxysmal nocturnal dyspnea and syncope.   Respiratory: Negative for cough, hemoptysis, shortness of breath, sputum production and wheezing.    Endocrine: Negative for cold intolerance, heat intolerance, polydipsia, polyphagia and polyuria.   Hematologic/Lymphatic: Negative for adenopathy and bleeding problem. Does not bruise/bleed easily.   Skin: Negative for color change, flushing, itching and rash.   Musculoskeletal: Negative for muscle cramps, muscle weakness, myalgias and stiffness.   Gastrointestinal: Negative for abdominal pain, diarrhea, hematemesis, hematochezia, nausea and vomiting.   Genitourinary: Negative for dysuria, frequency and nocturia.   Neurological: Negative for focal weakness, loss of balance, numbness, paresthesias and seizures.   Psychiatric/Behavioral: Negative for altered mental status, hallucinations and suicidal ideas.   Allergic/Immunologic: Negative for HIV exposure, hives and persistent infections.           Current Outpatient Medications:   •  multivitamins-minerals (PRESERVISION AREDS 2) capsule capsule, Take 1 capsule by mouth 2 (Two) Times a Day., Disp: , Rfl:   •  NIFEdipine XL (PROCARDIA XL) 60 MG 24 hr tablet, Take 1 tablet by mouth Daily., Disp: 30 tablet, Rfl: 3  •  predniSONE (DELTASONE) 5 MG tablet, Take 5 mg PO every morning plus extra as directed for illness, up to 5 extra tabs per month  Indications: Decreased Function of the Adrenal Gland, Disp: 105 tablet, Rfl: 3  •  rosuvastatin (Crestor) 20 MG tablet, Take 0.5 tablets by mouth Every Night., Disp: 90 tablet, Rfl: 3    Objective:   Vitals and nursing note reviewed.   Constitutional:       Appearance: Healthy appearance. Not in distress.   Neck:      Vascular: No JVR. JVD normal.   Pulmonary:      Effort: Pulmonary effort is normal.      Breath sounds: Normal breath sounds. No wheezing. No  "rhonchi. No rales.   Chest:      Chest wall: Not tender to palpatation.   Cardiovascular:      PMI at left midclavicular line. Normal rate. Regular rhythm. Normal S1. Normal S2.      Murmurs: There is no murmur.      No gallop. No click. No rub.   Pulses:     Intact distal pulses.   Edema:     Peripheral edema absent.   Abdominal:      General: Bowel sounds are normal.      Palpations: Abdomen is soft.      Tenderness: There is no abdominal tenderness.   Musculoskeletal: Normal range of motion.         General: No tenderness.      Extremities: Clubbing present.Skin:     General: Skin is warm and dry.   Neurological:      General: No focal deficit present.      Mental Status: Alert and oriented to person, place and time.       Blood pressure 130/68, pulse 82, height 175.3 cm (69\"), weight 99.8 kg (220 lb), SpO2 98 %.   Lab Review:     Assessment:       1. Essential hypertension  Acceptable blood pressure control on current medication profile.    2. Current every day smoker  Fortunately the patient has discontinued cigarette smoking for the last 3 weeks.    3. Dyslipidemia  Management per primary care provider.    4. Cardiomegaly  Incidental finding on CT scan of the chest.      ECG 12 Lead    Date/Time: 2/24/2022 9:49 AM  Performed by: Juancarlos Patel MD  Authorized by: Juancarlos Patel MD   Previous ECG: no previous ECG available  Rhythm: sinus rhythm  Rate: normal  Conduction: right bundle branch block  QRS axis: normal    Clinical impression: abnormal EKG            Plan:     Advance Care Planning   ACP discussion was held with the patient during this visit. Patient has an advance directive (not in EMR), copy requested.    The patient has been congratulated on his smoking cessation and cautioned to never again resume cigarette smoking.  I have recommended an echocardiogram to get formal measurement of his cardiac dimensions and to confirm the finding of cardiomegaly noted on CT scan.    "

## 2022-03-02 DIAGNOSIS — I10 ESSENTIAL HYPERTENSION: Chronic | ICD-10-CM

## 2022-03-02 RX ORDER — NIFEDIPINE 30 MG/1
TABLET, EXTENDED RELEASE ORAL
Qty: 30 TABLET | Refills: 1 | OUTPATIENT
Start: 2022-03-02

## 2022-03-07 ENCOUNTER — TELEPHONE (OUTPATIENT)
Dept: INTERNAL MEDICINE | Facility: CLINIC | Age: 67
End: 2022-03-07

## 2022-03-07 NOTE — TELEPHONE ENCOUNTER
These symptoms are potentially concerning, especially blurred vision, numbness of the head and face, hoarseness and vertigo.  I would have had him come in here for a visit today, if he is in [place] I would recommend to seek urgent care to find out what is going on.  I cannot change the blood pressure medications without seeing him, we also have to consider the possibility that his symptoms may be related to something else going on.

## 2022-03-07 NOTE — TELEPHONE ENCOUNTER
Caller: [email or web address]    Relationship: Emergency Contact    Best call back number: [phone]    What is the best time to reach you: ANYTIME     Who are you requesting to speak with (clinical staff, provider,  specific staff member): CLINICAL STAFF     What was the call regarding: PATIENT'S WIFE IS CALLING DUE TO THE SIDE EFFECTS THAT THE PATIENT IS EXPERIENCING FROM THE BLOOD PRESSURE MEDICATION. SHE SAYS THAT HE HAS  NUMBNESS IN FOREHEAD AND FACE, BLURRED VISION, HORSE THROAT, AND VERTIGO. THE MEDICATION HAS ALSO NOT LOWERED HIS BLOOD PRESSURE AT ALL. HIS MOST RECENT READING /103. SHE ASKS THAT A NEW MEDICATION THAT CAN BE SENT TO THE PHARMACY BE SENT TO [address] [address] [phone]      Do you require a callback: YES

## 2022-03-07 NOTE — TELEPHONE ENCOUNTER
Called patient advised per note  he verbalized understanding and is going to [place] and give us a call back to set up an appointment next week

## 2022-03-23 ENCOUNTER — TELEPHONE (OUTPATIENT)
Dept: INTERNAL MEDICINE | Facility: CLINIC | Age: 67
End: 2022-03-23

## 2022-03-23 NOTE — TELEPHONE ENCOUNTER
I would reduce the dose of nifedipine XL from 60 to 30 mg, which is what he was on before.  Does he still have left of the 30 mg?  Also has he checked his blood pressures when having these symptoms?  I would recommend a follow-up visit with me in 3 weeks, if needed I can send in a new Rx nifedipine XL 30 mg.  We can potentially discuss another addition instead of increasing the nifedipine on his follow-up visit.  Thanks

## 2022-03-23 NOTE — TELEPHONE ENCOUNTER
Patients verbalized understanding. Patient said he is out of 30mg but can take 1/2 tab of his 60mg. He already has an appt 04/25/22. He will call us if symptoms persist.

## 2022-03-23 NOTE — TELEPHONE ENCOUNTER
PATIENT HAVING SIDE EFFECTS FROM NIFEdipine XL (PROCARDIA XL) 60 MG 24 hr tablet MEDICATION.  FLUSHED, BLURRED VISION, CHEST PAINS, LETHARGIC.  WENT TO DOCTOR OUT OF TOWN WHO STARTED HIM TAKING IT IN THE EVENING.  A LITTLE BETTER BUT STILL HAVING EFFECTS.     PLEASE CALL [phone]

## 2022-03-31 LAB
BH CV ECHO MEAS - % IVS THICK: 20.2 %
BH CV ECHO MEAS - % LVPW THICK: 12.5 %
BH CV ECHO MEAS - AO MAX PG (FULL): 1.1 MMHG
BH CV ECHO MEAS - AO MAX PG: 6 MMHG
BH CV ECHO MEAS - AO MEAN PG (FULL): 1 MMHG
BH CV ECHO MEAS - AO MEAN PG: 3 MMHG
BH CV ECHO MEAS - AO ROOT AREA (BSA CORRECTED): 2.2
BH CV ECHO MEAS - AO ROOT AREA: 2.3 CM^2
BH CV ECHO MEAS - AO ROOT DIAM: 1.7 CM
BH CV ECHO MEAS - AO V2 MAX: 125 CM/SEC
BH CV ECHO MEAS - AO V2 MEAN: 84 CM/SEC
BH CV ECHO MEAS - AO V2 VTI: 23 CM
BH CV ECHO MEAS - AVA(I,A): 3.6 CM^2
BH CV ECHO MEAS - AVA(I,D): 3.6 CM^2
BH CV ECHO MEAS - AVA(V,A): 2.8 CM^2
BH CV ECHO MEAS - AVA(V,D): 2.8 CM^2
BH CV ECHO MEAS - BSA(HAYCOCK): 0.62 M^2
BH CV ECHO MEAS - BSA: 0.78 M^2
BH CV ECHO MEAS - BZI_BMI: 3 KILOGRAMS/M^2
BH CV ECHO MEAS - BZI_METRIC_HEIGHT: 175.3 CM
BH CV ECHO MEAS - BZI_METRIC_WEIGHT: 9.1 KG
BH CV ECHO MEAS - EDV(CUBED): 115.5 ML
BH CV ECHO MEAS - EDV(MOD-SP4): 126 ML
BH CV ECHO MEAS - EDV(TEICH): 111.2 ML
BH CV ECHO MEAS - EF(CUBED): 69.4 %
BH CV ECHO MEAS - EF(MOD-SP4): 69.3 %
BH CV ECHO MEAS - EF(TEICH): 60.9 %
BH CV ECHO MEAS - ESV(CUBED): 35.3 ML
BH CV ECHO MEAS - ESV(MOD-SP4): 38.7 ML
BH CV ECHO MEAS - ESV(TEICH): 43.5 ML
BH CV ECHO MEAS - FS: 32.6 %
BH CV ECHO MEAS - IVS/LVPW: 0.78
BH CV ECHO MEAS - IVSD: 1.2 CM
BH CV ECHO MEAS - IVSS: 1.4 CM
BH CV ECHO MEAS - LA DIMENSION: 4 CM
BH CV ECHO MEAS - LA/AO: 2.4
BH CV ECHO MEAS - LAD MAJOR: 6.4 CM
BH CV ECHO MEAS - LAT PEAK E' VEL: 6.8 CM/SEC
BH CV ECHO MEAS - LATERAL E/E' RATIO: 7.3
BH CV ECHO MEAS - LV DIASTOLIC VOL/BSA (35-75): 162.3 ML/M^2
BH CV ECHO MEAS - LV IVRT: 0.12 SEC
BH CV ECHO MEAS - LV MASS(C)D: 266.9 GRAMS
BH CV ECHO MEAS - LV MASS(C)DI: 343.7 GRAMS/M^2
BH CV ECHO MEAS - LV MASS(C)S: 191.4 GRAMS
BH CV ECHO MEAS - LV MASS(C)SI: 246.5 GRAMS/M^2
BH CV ECHO MEAS - LV MAX PG: 4.9 MMHG
BH CV ECHO MEAS - LV MEAN PG: 2 MMHG
BH CV ECHO MEAS - LV SYSTOLIC VOL/BSA (12-30): 49.8 ML/M^2
BH CV ECHO MEAS - LV V1 MAX: 111 CM/SEC
BH CV ECHO MEAS - LV V1 MEAN: 60.3 CM/SEC
BH CV ECHO MEAS - LV V1 VTI: 26.7 CM
BH CV ECHO MEAS - LVIDD: 4.9 CM
BH CV ECHO MEAS - LVIDS: 3.3 CM
BH CV ECHO MEAS - LVLD AP4: 8.7 CM
BH CV ECHO MEAS - LVLS AP4: 7.2 CM
BH CV ECHO MEAS - LVOT AREA (M): 3.1 CM^2
BH CV ECHO MEAS - LVOT AREA: 3.1 CM^2
BH CV ECHO MEAS - LVOT DIAM: 2 CM
BH CV ECHO MEAS - LVPWD: 1.3 CM
BH CV ECHO MEAS - LVPWS: 1.7 CM
BH CV ECHO MEAS - MED PEAK E' VEL: 8.4 CM/SEC
BH CV ECHO MEAS - MEDIAL E/E' RATIO: 5.9
BH CV ECHO MEAS - MV A MAX VEL: 50.3 CM/SEC
BH CV ECHO MEAS - MV DEC SLOPE: 243 CM/SEC^2
BH CV ECHO MEAS - MV DEC TIME: 0.21 SEC
BH CV ECHO MEAS - MV E MAX VEL: 49.5 CM/SEC
BH CV ECHO MEAS - MV E/A: 0.98
BH CV ECHO MEAS - MV MAX PG: 3.2 MMHG
BH CV ECHO MEAS - MV MEAN PG: 1 MMHG
BH CV ECHO MEAS - MV V2 MAX: 89.9 CM/SEC
BH CV ECHO MEAS - MV V2 MEAN: 48.2 CM/SEC
BH CV ECHO MEAS - MV V2 VTI: 28.6 CM
BH CV ECHO MEAS - MVA(VTI): 2.9 CM^2
BH CV ECHO MEAS - PA MAX PG: 2.7 MMHG
BH CV ECHO MEAS - PA V2 MAX: 82.3 CM/SEC
BH CV ECHO MEAS - RAP SYSTOLE: 3 MMHG
BH CV ECHO MEAS - RVSP: 15 MMHG
BH CV ECHO MEAS - SI(AO): 67.2 ML/M^2
BH CV ECHO MEAS - SI(CUBED): 103.3 ML/M^2
BH CV ECHO MEAS - SI(LVOT): 108 ML/M^2
BH CV ECHO MEAS - SI(MOD-SP4): 112.4 ML/M^2
BH CV ECHO MEAS - SI(TEICH): 87.2 ML/M^2
BH CV ECHO MEAS - SV(AO): 52.2 ML
BH CV ECHO MEAS - SV(CUBED): 80.2 ML
BH CV ECHO MEAS - SV(LVOT): 83.9 ML
BH CV ECHO MEAS - SV(MOD-SP4): 87.3 ML
BH CV ECHO MEAS - SV(TEICH): 67.7 ML
BH CV ECHO MEAS - TR MAX PG: 12 MMHG
BH CV ECHO MEAS - TR MAX VEL: 174 CM/SEC
BH CV ECHO MEAS - TV E MAX VEL: 58.7 CM/SEC
BH CV ECHO MEASUREMENTS AVERAGE E/E' RATIO: 6.51
LEFT ATRIUM VOLUME INDEX: 80.1 ML/M^2
LEFT ATRIUM VOLUME: 62.2 ML
LV EF 2D ECHO EST: 56 %
MAXIMAL PREDICTED HEART RATE: 154 BPM
STRESS TARGET HR: 131 BPM

## 2022-04-05 ENCOUNTER — OFFICE VISIT (OUTPATIENT)
Dept: CARDIOLOGY | Facility: CLINIC | Age: 67
End: 2022-04-05

## 2022-04-05 VITALS
SYSTOLIC BLOOD PRESSURE: 132 MMHG | WEIGHT: 217.6 LBS | OXYGEN SATURATION: 97 % | BODY MASS INDEX: 32.23 KG/M2 | DIASTOLIC BLOOD PRESSURE: 76 MMHG | HEIGHT: 69 IN | HEART RATE: 86 BPM

## 2022-04-05 DIAGNOSIS — Z72.0 TOBACCO ABUSE: ICD-10-CM

## 2022-04-05 DIAGNOSIS — H02.63 XANTHELASMA OF EYELID, BILATERAL: ICD-10-CM

## 2022-04-05 DIAGNOSIS — I51.7 CARDIOMEGALY: Primary | ICD-10-CM

## 2022-04-05 DIAGNOSIS — I10 ESSENTIAL HYPERTENSION: Chronic | ICD-10-CM

## 2022-04-05 DIAGNOSIS — E27.49 SECONDARY ADRENAL INSUFFICIENCY: Chronic | ICD-10-CM

## 2022-04-05 DIAGNOSIS — I11.9 LVH (LEFT VENTRICULAR HYPERTROPHY) DUE TO HYPERTENSIVE DISEASE, WITHOUT HEART FAILURE: ICD-10-CM

## 2022-04-05 DIAGNOSIS — H02.66 XANTHELASMA OF EYELID, BILATERAL: ICD-10-CM

## 2022-04-05 PROCEDURE — 99213 OFFICE O/P EST LOW 20 MIN: CPT | Performed by: INTERNAL MEDICINE

## 2022-04-05 RX ORDER — NIFEDIPINE 30 MG/1
30 TABLET, EXTENDED RELEASE ORAL DAILY
COMMUNITY
End: 2022-09-12 | Stop reason: SDUPTHER

## 2022-04-05 NOTE — PROGRESS NOTES
"    Subjective:     Encounter Date:04/05/2022      Patient ID: Yovany Gonzalez is a 67 y.o. male.    Chief Complaint: Cardiomegaly  HPI  This is a 67-year-old male patient who underwent a low-dose CT scan of the chest as a screening modality for lung cancer.  There was the incidental notation of \"cardiomegaly\".  The patient underwent an echocardiogram which demonstrated mild concentric left ventricular hypertrophy with no evidence of cardiac ventricular chamber dilatation.  There was evidence of grade 1 diastolic dysfunction as well as severe left atrial enlargement.  The patient was demonstrated to have aortic valve sclerosis without stenosis.  There is no evidence of pericardial disease.  The patient remains asymptomatic from a cardiovascular perspective.  He reports compliance with his medications.  He remains a reformed smoker.  The following portions of the patient's history were reviewed and updated as appropriate: allergies, current medications, past family history, past medical history, past social history, past surgical history and problem  Review of Systems   Constitutional: Negative for chills, diaphoresis, fever, malaise/fatigue, weight gain and weight loss.   HENT: Negative for ear discharge, hearing loss, hoarse voice and nosebleeds.    Eyes: Negative for discharge, double vision, pain and photophobia.   Cardiovascular: Negative for chest pain, claudication, cyanosis, dyspnea on exertion, irregular heartbeat, leg swelling, near-syncope, orthopnea, palpitations, paroxysmal nocturnal dyspnea and syncope.   Respiratory: Negative for cough, hemoptysis, shortness of breath, sputum production and wheezing.    Endocrine: Negative for cold intolerance, heat intolerance, polydipsia, polyphagia and polyuria.   Hematologic/Lymphatic: Negative for adenopathy and bleeding problem. Does not bruise/bleed easily.   Skin: Negative for color change, flushing, itching and rash.   Musculoskeletal: Negative for muscle " cramps, muscle weakness, myalgias and stiffness.   Gastrointestinal: Negative for abdominal pain, diarrhea, hematemesis, hematochezia, nausea and vomiting.   Genitourinary: Negative for dysuria, frequency and nocturia.   Neurological: Negative for focal weakness, loss of balance, numbness, paresthesias and seizures.   Psychiatric/Behavioral: Negative for altered mental status, hallucinations and suicidal ideas.   Allergic/Immunologic: Negative for HIV exposure, hives and persistent infections.           Current Outpatient Medications:   •  multivitamins-minerals (PRESERVISION AREDS 2) capsule capsule, Take 1 capsule by mouth 2 (Two) Times a Day., Disp: , Rfl:   •  NIFEdipine XL (PROCARDIA XL) 30 MG 24 hr tablet, Take 30 mg by mouth Daily., Disp: , Rfl:   •  NIFEdipine XL (PROCARDIA XL) 60 MG 24 hr tablet, Take 1 tablet by mouth Daily., Disp: 30 tablet, Rfl: 3  •  predniSONE (DELTASONE) 5 MG tablet, Take 5 mg PO every morning plus extra as directed for illness, up to 5 extra tabs per month  Indications: Decreased Function of the Adrenal Gland, Disp: 105 tablet, Rfl: 3  •  rosuvastatin (Crestor) 20 MG tablet, Take 0.5 tablets by mouth Every Night., Disp: 90 tablet, Rfl: 3    Objective:   Vitals and nursing note reviewed.   Constitutional:       Appearance: Healthy appearance. Not in distress.   Neck:      Vascular: No JVR. JVD normal.   Pulmonary:      Effort: Pulmonary effort is normal.      Breath sounds: Normal breath sounds. No wheezing. No rhonchi. No rales.   Chest:      Chest wall: Not tender to palpatation.   Cardiovascular:      PMI at left midclavicular line. Normal rate. Regular rhythm. Normal S1. Normal S2.      Murmurs: There is no murmur.      No gallop. No click. No rub.   Pulses:     Intact distal pulses.   Edema:     Peripheral edema absent.   Abdominal:      General: Bowel sounds are normal.      Palpations: Abdomen is soft.      Tenderness: There is no abdominal tenderness.   Musculoskeletal: Normal  "range of motion.         General: No tenderness. Skin:     General: Skin is warm and dry.      Comments: xanthelasma   Neurological:      General: No focal deficit present.      Mental Status: Alert and oriented to person, place and time.       Blood pressure 132/76, pulse 86, height 175.3 cm (69\"), weight 98.7 kg (217 lb 9.6 oz), SpO2 97 %.   Lab Review:     Assessment:       1. Cardiomegaly  Left ventricular hypertrophy and left atrial enlargement explains the patient's enlarged cardiac silhouette on CT scan of the chest without evidence of right ventricular left ventricular chamber enlargement.  No signs or symptoms of congestive heart failure.    2. Essential hypertension  Acceptable blood pressure control.    3. Secondary adrenal insufficiency (HCC)  Maintenance glucocorticoid supplementation with prednisone 5 mg once per day    4. LVH (left ventricular hypertrophy) due to hypertensive disease, without heart failure      5. Xanthelasma of eyelid, bilateral  The patient is tolerating high intensity statin based cholesterol-lowering therapy without side effects.    6. Tobacco abuse  Fortunately the patient no longer smokes.    Procedures    Plan:     Advance Care Planning   ACP discussion was held with the patient during this visit. Patient has an advance directive (not in EMR), copy requested.     The patient has been congratulated on his smoking cessation and cautioned to never again resume cigarette smoking.  An explanation of left ventricular hypertrophy has been provided.  The patient has been educated regarding the consequences of left ventricular hypertrophy and hypertensive cardiac disease.  No further cardiovascular testing is indicated at this time.      "

## 2022-04-25 ENCOUNTER — OFFICE VISIT (OUTPATIENT)
Dept: INTERNAL MEDICINE | Facility: CLINIC | Age: 67
End: 2022-04-25

## 2022-04-25 VITALS
WEIGHT: 217 LBS | DIASTOLIC BLOOD PRESSURE: 74 MMHG | BODY MASS INDEX: 32.14 KG/M2 | SYSTOLIC BLOOD PRESSURE: 124 MMHG | TEMPERATURE: 98.2 F | HEIGHT: 69 IN | HEART RATE: 64 BPM

## 2022-04-25 DIAGNOSIS — E66.9 OBESITY (BMI 30.0-34.9): ICD-10-CM

## 2022-04-25 DIAGNOSIS — I10 ESSENTIAL HYPERTENSION: Chronic | ICD-10-CM

## 2022-04-25 DIAGNOSIS — E78.01 FAMILIAL HYPERCHOLESTEREMIA: ICD-10-CM

## 2022-04-25 DIAGNOSIS — I11.9 LVH (LEFT VENTRICULAR HYPERTROPHY) DUE TO HYPERTENSIVE DISEASE, WITHOUT HEART FAILURE: ICD-10-CM

## 2022-04-25 DIAGNOSIS — Z00.00 ENCOUNTER FOR SUBSEQUENT ANNUAL WELLNESS VISIT IN MEDICARE PATIENT: Primary | ICD-10-CM

## 2022-04-25 DIAGNOSIS — Z13.6 ENCOUNTER FOR ABDOMINAL AORTIC ANEURYSM (AAA) SCREENING: ICD-10-CM

## 2022-04-25 DIAGNOSIS — R91.8 MULTIPLE LUNG NODULES ON CT: ICD-10-CM

## 2022-04-25 PROBLEM — E66.811 OBESITY (BMI 30.0-34.9): Status: ACTIVE | Noted: 2022-04-25

## 2022-04-25 PROCEDURE — 1159F MED LIST DOCD IN RCRD: CPT | Performed by: STUDENT IN AN ORGANIZED HEALTH CARE EDUCATION/TRAINING PROGRAM

## 2022-04-25 PROCEDURE — G0439 PPPS, SUBSEQ VISIT: HCPCS | Performed by: STUDENT IN AN ORGANIZED HEALTH CARE EDUCATION/TRAINING PROGRAM

## 2022-04-25 PROCEDURE — 1170F FXNL STATUS ASSESSED: CPT | Performed by: STUDENT IN AN ORGANIZED HEALTH CARE EDUCATION/TRAINING PROGRAM

## 2022-04-25 RX ORDER — LISINOPRIL 2.5 MG/1
2.5 TABLET ORAL DAILY
Qty: 30 TABLET | Refills: 0 | Status: SHIPPED | OUTPATIENT
Start: 2022-04-25 | End: 2022-05-25

## 2022-04-25 RX ORDER — ROSUVASTATIN CALCIUM 10 MG/1
10 TABLET, COATED ORAL NIGHTLY
Qty: 90 TABLET | Refills: 3
Start: 2022-04-25 | End: 2022-08-25 | Stop reason: SINTOL

## 2022-04-25 NOTE — PROGRESS NOTES
QUICK REFERENCE INFORMATION:  The ABCs of the Annual Wellness Visit    Subsequent Medicare Wellness Visit    From [place]. Moved to [place] . Worked as  in the past, retired . . No children.     Patient has a past medical history of ACTH deficiency with secondary adrenal insufficiency, hypertension (), hyperlipidemia, colon adenoma, cataracts and presbyopia.    Patient is here for annual Medicare visit and follow-up.    Did see cardiology and echocardiogram did not show some LVH, likely in setting of his hypertension.  Blood we did increase his nifedipine from 30 to 60 mg, but he blurry vision and dizziness and really did not tolerate.  He has been checking his blood pressure at home and it is typically in the 120s and 130s over 70s and 80s.  He has had SBP in the 140s.    He also is on Crestor, he did not tolerate 20 mg which gave him myalgias, but he does well on 10 mg.    Patient did smoke in the past, never had AAA screening and is interested in proceeding with ultrasound.    Patient also was found to have lung nodules, and is due for a repeat in September of this year..    HEALTH RISK ASSESSMENT    1955    Recent Hospitalizations:  No hospitalization(s) within the last year..        Current Medical Providers:  Patient Care Team:  Anthony Cramer MD as PCP - General (Family Medicine)  Elmo Sanchez MD as Consulting Physician (General Surgery)  Xenia Bryson MD as Consulting Physician (Endocrinology)        Smoking Status:  Social History     Tobacco Use   Smoking Status Former Smoker   • Packs/day: 0.50   • Years: 40.00   • Pack years: 20.00   • Types: Cigarettes   • Quit date: 2022   • Years since quittin.2   Smokeless Tobacco Never Used   Tobacco Comment    Quit 2022       Alcohol Consumption:  Social History     Substance and Sexual Activity   Alcohol Use Yes   • Alcohol/week: 1.0 standard drink   • Types: 1 Cans of beer per week    Comment: social        Depression Screen:   PHQ-2/PHQ-9 Depression Screening 4/25/2022   Retired PHQ-9 Total Score -   Retired Total Score -   Little Interest or Pleasure in Doing Things 0-->not at all   Feeling Down, Depressed or Hopeless 0-->not at all   PHQ-9: Brief Depression Severity Measure Score 0       Health Habits and Functional and Cognitive Screening:  Functional & Cognitive Status 4/25/2022   Do you have difficulty preparing food and eating? No   Do you have difficulty bathing yourself, getting dressed or grooming yourself? No   Do you have difficulty using the toilet? No   Do you have difficulty moving around from place to place? No   Do you have trouble with steps or getting out of a bed or a chair? No   Current Diet Well Balanced Diet   Dental Exam Up to date   Eye Exam Up to date   Exercise (times per week) 0 times per week   Current Exercises Include No Regular Exercise   Do you need help using the phone?  No   Are you deaf or do you have serious difficulty hearing?  Yes   Do you need help with transportation? No   Do you need help shopping? No   Do you need help preparing meals?  No   Do you need help with housework?  No   Do you need help with laundry? No   Do you need help taking your medications? No   Do you need help managing money? No   Do you ever drive or ride in a car without wearing a seat belt? No   Have you felt unusual stress, anger or loneliness in the last month? Yes   Who do you live with? Spouse   If you need help, do you have trouble finding someone available to you? No   Have you been bothered in the last four weeks by sexual problems? No   Do you have difficulty concentrating, remembering or making decisions? No       Fall Risk Screen:  STEADI Fall Risk Assessment was completed, and patient is at LOW risk for falls.Assessment completed on:4/25/2022    ACE III MINI        Does the patient have evidence of cognitive impairment? No    Aspirin use counseling: Does not need ASA (and currently is not  on it)    Recent Lab Results:  CMP:  Lab Results   Component Value Date    BUN 23 12/29/2020    CREATININE 1.00 12/26/2017    EGFRIFNONA 76 12/26/2017    EGFRIFAFRI 92 12/26/2017    BCR 24.0 (H) 12/26/2017     12/29/2020    K 4.0 12/29/2020    CO2 30 12/29/2020    CALCIUM 9.4 12/29/2020    PROTENTOTREF 6.6 12/26/2017    ALBUMIN 4.40 12/29/2020    LABGLOBREF 2.6 12/26/2017    LABIL2 1.5 12/26/2017    BILITOT 0.5 12/26/2017    ALKPHOS 64 12/29/2020    AST 33 12/29/2020    ALT 43 (A) 12/29/2020     HbA1c:  Lab Results   Component Value Date    HGBA1C 5.3 12/29/2020    HGBA1C 5.30 12/26/2017     Microalbumin:  No results found for: MICROALBUR, POCMALB, POCCREAT  Lipid Panel  Lab Results   Component Value Date    TRIG 101 12/29/2020    HDL 58 12/29/2020     (H) 12/29/2020    AST 33 12/29/2020    ALT 43 (A) 12/29/2020       Visual Acuity:  No exam data present    Age-appropriate Screening Schedule:  Refer to the list below for future screening recommendations based on patient's age, sex and/or medical conditions. Orders for these recommended tests are listed in the plan section. The patient has been provided with a written plan.    Health Maintenance   Topic Date Due   • ZOSTER VACCINE (1 of 2) Never done   • INFLUENZA VACCINE  08/01/2022   • LIPID PANEL  08/20/2022   • TDAP/TD VACCINES (2 - Td or Tdap) 09/10/2031        Subjective   History of Present Illness    Yovany Gonzalez is a 67 y.o. male who presents for a Subsequent Wellness Visit.    CHRONIC CONDITIONS    The following portions of the patient's history were reviewed and updated as appropriate: allergies, current medications, past family history, past medical history, past social history, past surgical history and problem list.    Outpatient Medications Prior to Visit   Medication Sig Dispense Refill   • multivitamins-minerals (PRESERVISION AREDS 2) capsule capsule Take 1 capsule by mouth 2 (Two) Times a Day.     • predniSONE (DELTASONE) 5 MG  tablet Take 5 mg PO every morning plus extra as directed for illness, up to 5 extra tabs per month  Indications: Decreased Function of the Adrenal Gland 105 tablet 3   • NIFEdipine XL (PROCARDIA XL) 60 MG 24 hr tablet Take 1 tablet by mouth Daily. (Patient taking differently: Take 60 mg by mouth Daily. Taking 1/2 tablet nightly) 30 tablet 3   • rosuvastatin (Crestor) 20 MG tablet Take 0.5 tablets by mouth Every Night. 90 tablet 3   • NIFEdipine XL (PROCARDIA XL) 30 MG 24 hr tablet Take 30 mg by mouth Daily.       No facility-administered medications prior to visit.       Patient Active Problem List   Diagnosis   • Secondary adrenal insufficiency (HCC)   • Dyslipidemia   • Tubular adenoma of colon   • ACTH deficiency (HCC)   • Bilateral presbyopia   • Cataract, nuclear sclerotic, both eyes   • Essential hypertension   • Multiple lung nodules on CT   • Cardiomegaly   • LVH (left ventricular hypertrophy) due to hypertensive disease, without heart failure   • Xanthelasma of eyelid, bilateral   • Tobacco abuse   • Obesity (BMI 30.0-34.9)       Advance Care Planning:  ACP discussion was held with the patient during this visit. Patient does not have an advance directive, information provided.    Identification of Risk Factors:  Risk factors include: Abdominal Aortic Aneurysm Screening  Advance Directive Discussion  Obesity/Overweight .    Review of Systems    Compared to one year ago, the patient feels his physical health is better.  Compared to one year ago, the patient feels his mental health is better.    Objective     Physical Exam  Vitals reviewed.   Constitutional:       Appearance: Normal appearance.   HENT:      Head: Normocephalic and atraumatic.      Nose: No congestion.   Eyes:      Extraocular Movements: Extraocular movements intact.      Conjunctiva/sclera: Conjunctivae normal.   Cardiovascular:      Rate and Rhythm: Normal rate and regular rhythm.      Heart sounds: Normal heart sounds. No murmur  "heard.  Pulmonary:      Effort: Pulmonary effort is normal.      Breath sounds: Normal breath sounds.   Abdominal:      General: There is no distension.      Palpations: Abdomen is soft. There is no mass.      Tenderness: There is no abdominal tenderness.   Musculoskeletal:      Cervical back: Neck supple.      Right lower leg: No edema.   Skin:     General: Skin is warm and dry.   Neurological:      Mental Status: He is alert and oriented to person, place, and time. Mental status is at baseline.   Psychiatric:         Behavior: Behavior normal.         Thought Content: Thought content normal.          Procedures     Vitals:    04/25/22 1239   BP: 124/74   BP Location: Left arm   Patient Position: Sitting   Cuff Size: Adult   Pulse: 64   Temp: 98.2 °F (36.8 °C)   TempSrc: Temporal   Weight: 98.4 kg (217 lb)   Height: 175 cm (68.9\")   PainSc:   4   PainLoc: Knee  Comment: left       Patient's Body mass index is 32.14 kg/m². indicating that he is obese (BMI >30). Obesity-related health conditions include the following: hypertension and dyslipidemias. Obesity is unchanged. BMI is is above average; BMI management plan is completed. We discussed portion control and increasing exercise..      Assessment/Plan     1. Encounter for subsequent annual wellness visit in Medicare patient    2. Essential hypertension  3. LVH (left ventricular hypertrophy) due to hypertensive disease, without heart failure    BP Readings from Last 3 Encounters:   04/25/22 124/74   04/05/22 132/76   03/31/22 130/68   Blood pressures are still a little bit on the border, and given his LVH I recommend adding low-dose lisinopril.  Counseled on side effects including dry cough, and if he experiences symptoms he will let me know.  Also if his blood pressures would be running over 140/90 he will let me know.  - lisinopril (PRINIVIL,ZESTRIL) 2.5 MG tablet; Take 1 tablet by mouth Daily.  Dispense: 30 tablet; Refill: 0    4. Familial " hypercholesteremia  Continue on rosuvastatin 10 mg, which is the highest tolerated dose.  - rosuvastatin (Crestor) 10 MG tablet; Take 1 tablet by mouth Every Night.  Dispense: 90 tablet; Refill: 3    5. Encounter for abdominal aortic aneurysm (AAA) screening  We will screen as recommended  - [place] aaa screen limited; Future    6. Multiple lung nodules on CT  Patient will return for follow-up with me in August, will order CT scan of the    7. Obesity (BMI 30.0-34.9)      Patient Self-Management and Personalized Health Advice  The patient has been provided with information about: exercise and designing advance directives and preventive services including:   · Annual Wellness Visit (AWV)  · Ultrasound Screening for Abdominal Aortic Aneurysm (AAA).    Outpatient Encounter Medications as of 4/25/2022   Medication Sig Dispense Refill   • multivitamins-minerals (PRESERVISION AREDS 2) capsule capsule Take 1 capsule by mouth 2 (Two) Times a Day.     • predniSONE (DELTASONE) 5 MG tablet Take 5 mg PO every morning plus extra as directed for illness, up to 5 extra tabs per month  Indications: Decreased Function of the Adrenal Gland 105 tablet 3   • rosuvastatin (Crestor) 10 MG tablet Take 1 tablet by mouth Every Night. 90 tablet 3   • [DISCONTINUED] NIFEdipine XL (PROCARDIA XL) 60 MG 24 hr tablet Take 1 tablet by mouth Daily. (Patient taking differently: Take 60 mg by mouth Daily. Taking 1/2 tablet nightly) 30 tablet 3   • [DISCONTINUED] rosuvastatin (Crestor) 20 MG tablet Take 0.5 tablets by mouth Every Night. 90 tablet 3   • lisinopril (PRINIVIL,ZESTRIL) 2.5 MG tablet Take 1 tablet by mouth Daily. 30 tablet 0   • NIFEdipine XL (PROCARDIA XL) 30 MG 24 hr tablet Take 30 mg by mouth Daily.       No facility-administered encounter medications on file as of 4/25/2022.       Reviewed use of high risk medication in the elderly: yes  Reviewed for potential of harmful drug interactions in the elderly: yes    Follow Up:  Return in about 4  months (around 8/25/2022) for Recheck.     Future Appointments       Provider Department Center    5/23/2022 3:15 PM Xenia Bryson MD [facility] ENDOCRINOLOGY [place]    8/25/2022 11:30 AM Anthony Cramer MD [facility] INTERNAL MEDICINE [place]            There are no Patient Instructions on file for this visit.    An After Visit Summary and PPPS with all of these plans were given to the patient.      Anthony Cramer MD

## 2022-05-03 ENCOUNTER — HOSPITAL ENCOUNTER (OUTPATIENT)
Dept: ULTRASOUND IMAGING | Facility: HOSPITAL | Age: 67
Discharge: HOME | End: 2022-05-03
Admitting: STUDENT IN AN ORGANIZED HEALTH CARE EDUCATION/TRAINING PROGRAM

## 2022-05-03 DIAGNOSIS — Z13.6 ENCOUNTER FOR ABDOMINAL AORTIC ANEURYSM (AAA) SCREENING: ICD-10-CM

## 2022-05-03 PROCEDURE — 76706 US ABDL AORTA SCREEN AAA: CPT

## 2022-05-17 ENCOUNTER — TELEPHONE (OUTPATIENT)
Dept: INTERNAL MEDICINE | Facility: CLINIC | Age: 67
End: 2022-05-17

## 2022-05-17 NOTE — TELEPHONE ENCOUNTER
Patient had CMP, Lipid, and PSA done and scanned in on 04/05/22  through [facility] can I cancel existing lab order from 02/08/22.

## 2022-05-23 ENCOUNTER — OFFICE VISIT (OUTPATIENT)
Dept: ENDOCRINOLOGY | Facility: CLINIC | Age: 67
End: 2022-05-23

## 2022-05-23 VITALS
HEIGHT: 69 IN | HEART RATE: 55 BPM | WEIGHT: 218 LBS | BODY MASS INDEX: 32.29 KG/M2 | OXYGEN SATURATION: 98 % | SYSTOLIC BLOOD PRESSURE: 134 MMHG | DIASTOLIC BLOOD PRESSURE: 72 MMHG

## 2022-05-23 DIAGNOSIS — E27.49 SECONDARY ADRENAL INSUFFICIENCY: Primary | Chronic | ICD-10-CM

## 2022-05-23 PROCEDURE — 99213 OFFICE O/P EST LOW 20 MIN: CPT | Performed by: INTERNAL MEDICINE

## 2022-05-23 RX ORDER — MELATONIN
1000 DAILY
COMMUNITY

## 2022-05-23 NOTE — PROGRESS NOTES
Chief Complaint   Patient presents with   • Secondary adrenal insufficiency     Follow up        HPI:   Yovany Gonzalez is a 67 y.o.male who returns to [facility] for f/u evaluation and management of his secondary adrenal insufficiency. Last visit 2021. His history is as follows:    Interim Events:   - Had recent difficulties with HTN. Now controlled on Nifedipine XL 30 mg daily and lisinopril 2.5 mg daily  - Taking prednisone as directed. May take an extra 1/2 tab when doing increased physical activity.     1) secondary adrenal insufficiency:  - diagnosed in . Was living in [place] at the time. Presented to [facility] with symptoms of significant wt loss of 60 lbs, increased fatigue and hypersomnia. He recalls having serum AM and PM cortisol levels and a bone marrow biopsy as part of his evaluation. Not clear if he had any type of stimulation test at that time. MRI of [place] in  and  did not show an abnormality. Was told he had isolated ACTH deficiency.   - Has taken prednisone since time of diagnosis. Has tried BID dosing of hydrocortisone for approximately 1 month but did not feel well on this glucocorticoid.    Current Dose: prednisone 5 mg qAM  (May take an extra 1/2 tab when doing increased physical activity.)    Has not had to use stress dosing recently.  Not wearing a medical alert bracelet or necklace     DEXA ([facility], 2019) No osteoporosis, No osteopenia    2) familial hypercholesteremia:  - pt was prescribed rosuvastatin 20 mg daily by his PCP in . (2020) Tchol 304, HDL 58, ,  prior to starting medication.  - Repeat Lipid panel 2021: Tchol 161, HDL 47, LDL 94,  - on Crestor 20 mg daily. Reported not tolerating the 20 mg dose of crestor.   - Is now tolerating crestor 10 mg daily    FMHx: No known CAD. Pt's mother  when he was 5 y.o. Paternal grandfather had an MI after age 60.     Review of Systems   Constitutional: Negative.   "Negative for fatigue.        Wt stable   HENT: Positive for hearing loss.    Eyes: Negative.    Respiratory: Negative.  Negative for cough.    Cardiovascular: Negative.    Gastrointestinal: Negative.    Endocrine: Negative.  Negative for heat intolerance.   Genitourinary: Negative.    Musculoskeletal: Positive for arthralgias. Negative for myalgias and neck stiffness.   Skin: Negative.    Allergic/Immunologic: Negative.    Neurological: Negative.  Negative for tremors and headaches.   Hematological: Negative.    Psychiatric/Behavioral: Negative.        The following portions of the patient's history were reviewed and updated as appropriate: allergies, current medications, past family history, past medical history, past social history, past surgical history and problem list.      /72   Pulse 55   Ht 175.3 cm (69\")   Wt 98.9 kg (218 lb)   SpO2 98%   BMI 32.19 kg/m²   Physical Exam  Vitals reviewed.   Constitutional:       General: He is not in acute distress.     Appearance: He is well-developed. He is not diaphoretic.   HENT:      Head: Normocephalic.   Eyes:      Conjunctiva/sclera: Conjunctivae normal.      Pupils: Pupils are equal, round, and reactive to light.   Neck:      Thyroid: No thyromegaly.      Trachea: No tracheal deviation.      Comments: No palpable thyroid nodules    Cardiovascular:      Rate and Rhythm: Normal rate and regular rhythm.      Heart sounds: Normal heart sounds. No murmur heard.  Pulmonary:      Effort: Pulmonary effort is normal. No respiratory distress.      Breath sounds: Normal breath sounds.   Abdominal:      General: Bowel sounds are normal.      Palpations: Abdomen is soft. There is no mass.      Tenderness: There is no abdominal tenderness.   Musculoskeletal:      Cervical back: Neck supple.   Lymphadenopathy:      Cervical: No cervical adenopathy.   Skin:     General: Skin is warm and dry.      Findings: Lesion (eyelid xanthomas) present. No erythema.   Neurological: "      Mental Status: He is alert and oriented to person, place, and time.      Cranial Nerves: No cranial nerve deficit.   Psychiatric:         Behavior: Behavior normal.       LABS/IMAGING: outside records reviewed and summarized in HPI    ASSESSMENT/PLAN:  1) secondary adrenal insufficiency:   - clinically without symptoms of AI  - Will continue prednisone 5 mg qAM plus extra as needed during illness.  May take an extra 1/2 tab when doing increased physical activity.   - Reviewed how to increase dosing during times of illness.   - Encouraged pt to wear a medical alert bracelet or necklace    RTC 12 months    Signed: Xenia Bryson MD

## 2022-05-25 DIAGNOSIS — I10 ESSENTIAL HYPERTENSION: Chronic | ICD-10-CM

## 2022-05-25 RX ORDER — LISINOPRIL 2.5 MG/1
TABLET ORAL
Qty: 30 TABLET | Refills: 3 | Status: SHIPPED | OUTPATIENT
Start: 2022-05-25 | End: 2022-09-12 | Stop reason: SDUPTHER

## 2022-05-25 NOTE — TELEPHONE ENCOUNTER
Rx Refill Note  Requested Prescriptions     Pending Prescriptions Disp Refills   • lisinopril (PRINIVIL,ZESTRIL) 2.5 MG tablet [Pharmacy Med Name: LISINOPRIL 2.5 MG TABLET] 30 tablet 0     Sig: TAKE 1 TABLET BY MOUTH EVERY DAY      Last office visit with prescribing clinician: 4/25/2022      Next office visit with prescribing clinician: 8/25/2022            Kavitha Reis LPN  05/25/22, 10:37 EDT

## 2022-07-05 ENCOUNTER — OFFICE VISIT (OUTPATIENT)
Dept: INTERNAL MEDICINE | Facility: CLINIC | Age: 67
End: 2022-07-05

## 2022-07-05 ENCOUNTER — HOSPITAL ENCOUNTER (OUTPATIENT)
Dept: GENERAL RADIOLOGY | Facility: HOSPITAL | Age: 67
Discharge: HOME | End: 2022-07-05

## 2022-07-05 ENCOUNTER — LAB (OUTPATIENT)
Dept: LAB | Facility: HOSPITAL | Age: 67
End: 2022-07-05

## 2022-07-05 VITALS
BODY MASS INDEX: 32.2 KG/M2 | HEIGHT: 69 IN | DIASTOLIC BLOOD PRESSURE: 78 MMHG | HEART RATE: 60 BPM | WEIGHT: 217.4 LBS | SYSTOLIC BLOOD PRESSURE: 116 MMHG | TEMPERATURE: 98.2 F

## 2022-07-05 DIAGNOSIS — E78.00 HYPERCHOLESTEROLEMIA WITH LDL GREATER THAN 190 MG/DL: Chronic | ICD-10-CM

## 2022-07-05 DIAGNOSIS — M25.562 PAIN IN BOTH KNEES, UNSPECIFIED CHRONICITY: ICD-10-CM

## 2022-07-05 DIAGNOSIS — M25.562 PAIN IN BOTH KNEES, UNSPECIFIED CHRONICITY: Primary | ICD-10-CM

## 2022-07-05 DIAGNOSIS — M25.561 PAIN IN BOTH KNEES, UNSPECIFIED CHRONICITY: ICD-10-CM

## 2022-07-05 DIAGNOSIS — M25.561 PAIN IN BOTH KNEES, UNSPECIFIED CHRONICITY: Primary | ICD-10-CM

## 2022-07-05 DIAGNOSIS — H02.63 XANTHELASMA OF EYELID, BILATERAL: ICD-10-CM

## 2022-07-05 DIAGNOSIS — E66.9 OBESITY (BMI 30.0-34.9): ICD-10-CM

## 2022-07-05 DIAGNOSIS — H02.66 XANTHELASMA OF EYELID, BILATERAL: ICD-10-CM

## 2022-07-05 DIAGNOSIS — Z78.9 STATIN INTOLERANCE: ICD-10-CM

## 2022-07-05 LAB
CK SERPL-CCNC: 89 U/L (ref 20–200)
CRP SERPL-MCNC: <0.3 MG/DL (ref 0–0.5)
ERYTHROCYTE [SEDIMENTATION RATE] IN BLOOD: 10 MM/HR (ref 0–20)

## 2022-07-05 PROCEDURE — 36415 COLL VENOUS BLD VENIPUNCTURE: CPT

## 2022-07-05 PROCEDURE — 73560 X-RAY EXAM OF KNEE 1 OR 2: CPT

## 2022-07-05 PROCEDURE — 86140 C-REACTIVE PROTEIN: CPT | Performed by: STUDENT IN AN ORGANIZED HEALTH CARE EDUCATION/TRAINING PROGRAM

## 2022-07-05 PROCEDURE — 99214 OFFICE O/P EST MOD 30 MIN: CPT | Performed by: STUDENT IN AN ORGANIZED HEALTH CARE EDUCATION/TRAINING PROGRAM

## 2022-07-05 PROCEDURE — 82550 ASSAY OF CK (CPK): CPT | Performed by: STUDENT IN AN ORGANIZED HEALTH CARE EDUCATION/TRAINING PROGRAM

## 2022-07-05 PROCEDURE — 85652 RBC SED RATE AUTOMATED: CPT | Performed by: STUDENT IN AN ORGANIZED HEALTH CARE EDUCATION/TRAINING PROGRAM

## 2022-07-05 NOTE — PROGRESS NOTES
"Chief Complaint  Yovany Gonzalez is a 67 y.o. male presenting for Pain (Left leg and knee  ).     From [place]. Moved to [place] 2013. Worked as  in the past, retired 2018. . No children.     Patient has a past medical history of ACTH deficiency with secondary adrenal insufficiency, hypertension (2022), hyperlipidemia, colon adenoma, cataracts and presbyopia.    History of Present Illness  Patient is here due to pain of his left knee.  No recent or remote injury.  No family history of osteoarthritis, no history of osteoarthritis with patient.  Over the last 2 weeks he is reporting clicking sound of his left knee, feels rough when he moves it.  No morning stiffness of joints.  In the past he was on another statin and was having muscle pains and eventually stopped the statin.  He is wondering if the statin partially could be related to muscle pain at the moment.  He also states that he did have some hoarseness while he was on statin in the past, and that has recurred over the last 3-4 weeks.    The following portions of the patient's history were reviewed and updated as appropriate: allergies, current medications, past family history, past medical history, past social history, past surgical history and problem list.    Objective  /78 (BP Location: Left arm, Patient Position: Sitting, Cuff Size: Adult)   Pulse 60   Temp 98.2 °F (36.8 °C) (Temporal)   Ht 175.3 cm (69.02\")   Wt 98.6 kg (217 lb 6.4 oz)   BMI 32.09 kg/m²     Physical Exam  Vitals reviewed.   Constitutional:       Appearance: Normal appearance.   HENT:      Head: Normocephalic and atraumatic.      Nose: No congestion.   Eyes:      Extraocular Movements: Extraocular movements intact.      Conjunctiva/sclera: Conjunctivae normal.   Cardiovascular:      Rate and Rhythm: Normal rate and regular rhythm.      Heart sounds: Normal heart sounds. No murmur heard.  Pulmonary:      Effort: Pulmonary effort is normal.      Breath " sounds: Normal breath sounds.   Musculoskeletal:         General: No swelling or tenderness.      Cervical back: Neck supple.      Right lower leg: No edema.      Left lower leg: No edema.      Comments: Mild crepitus on movement of both knees.  No joint effusion.  Good ROM.   Skin:     General: Skin is warm and dry.   Neurological:      Mental Status: He is alert and oriented to person, place, and time. Mental status is at baseline.   Psychiatric:         Behavior: Behavior normal.         Thought Content: Thought content normal.         Assessment/Plan   1. Pain in both knees, unspecified chronicity  I suspect this is most likely related to osteoarthritis.  We will do x-ray of the knees.  We will also check inflammation and CPK.  No morning stiffness, so likely not rheumatic condition.  Patient feels pain is manageable.  We have discussed physical therapy, that can be beneficial.  Also exercise like walking can be very beneficial.  - C-reactive Protein; Future  - Sedimentation Rate; Future  - CK; Future  - XR Knee 1 or 2 View Left; Future  - XR Knee 1 or 2 View Right; Future    2. Hypercholesterolemia with LDL greater than 190 mg/dL  3. Xanthelasma of eyelid, bilateral  4. Statin intolerance  Patient has tolerated 5 mg of rosuvastatin, however he can do trial off the medication to see if there is any difference.  There is other options like Zetia could be considered.  Patient will follow-up with me in August as planned    5. Obesity (BMI 30.0-34.9)  BMI is >= 30 and <35. (Class 1 Obesity). The following options were offered after discussion;: exercise counseling/recommendations    Return if symptoms worsen or fail to improve, for Next scheduled follow up.    Future Appointments       Provider Department [place]    7/5/2022 3:45 PM [facility] XR 1 [facility] XRAY AT [facility]    8/25/2022 11:30 AM Anthony Cramer MD [facility] INTERNAL MEDICINE [place]    5/24/2023 1:30 PM  "Xenia Bryson MD [facility] ENDOCRINOLOGY [place]          Anthony Cramer MD  Family Medicine  07/05/2022    Answers for HPI/ROS submitted by the patient on 6/29/2022  What is the primary reason for your visit?: Other  Please describe your symptoms.: Started getting knee pain in left leg, has progressed to mild pain throughout both legs with knee caps \"snapping\".  Difficult to walk comfortably. Also dry cough.  Have you had these symptoms before?: No  How long have you been having these symptoms?: 1-2 weeks  Please list any medications you are currently taking for this condition.: Kaden  Please describe any probable cause for these symptoms. : Blood pressure medicine?      "

## 2022-08-17 ENCOUNTER — TELEPHONE (OUTPATIENT)
Dept: INTERNAL MEDICINE | Facility: CLINIC | Age: 67
End: 2022-08-17

## 2022-08-17 DIAGNOSIS — I10 ESSENTIAL HYPERTENSION: Primary | ICD-10-CM

## 2022-08-17 NOTE — TELEPHONE ENCOUNTER
I have placed order.  I do not think we need to repeat cholesterol level or PSA prostate cancer screening, which we did earlier this year.

## 2022-08-17 NOTE — TELEPHONE ENCOUNTER
Caller: Yovany Gonzalez    Relationship: Self    Best call back number: [phone]    What orders are you requesting (i.e. lab or imaging): LABS    In what timeframe would the patient need to come in: PRIOR TO 8/25/22 APOINTMENT    Where will you receive your lab/imaging services: IN OFFICE    Additional notes: WANTS TO HAVE RESULTS SO HE CAN DISCUSS AT APPOINTMENT- PLEASE CALL TO STELLA

## 2022-08-25 ENCOUNTER — OFFICE VISIT (OUTPATIENT)
Dept: INTERNAL MEDICINE | Facility: CLINIC | Age: 67
End: 2022-08-25

## 2022-08-25 ENCOUNTER — LAB (OUTPATIENT)
Dept: LAB | Facility: HOSPITAL | Age: 67
End: 2022-08-25

## 2022-08-25 VITALS
HEART RATE: 60 BPM | WEIGHT: 216 LBS | TEMPERATURE: 98.2 F | HEIGHT: 69 IN | DIASTOLIC BLOOD PRESSURE: 74 MMHG | BODY MASS INDEX: 31.99 KG/M2 | SYSTOLIC BLOOD PRESSURE: 130 MMHG

## 2022-08-25 DIAGNOSIS — R91.8 MULTIPLE LUNG NODULES ON CT: ICD-10-CM

## 2022-08-25 DIAGNOSIS — E66.9 OBESITY (BMI 30.0-34.9): ICD-10-CM

## 2022-08-25 DIAGNOSIS — E78.00 HYPERCHOLESTEROLEMIA WITH LDL GREATER THAN 190 MG/DL: Chronic | ICD-10-CM

## 2022-08-25 DIAGNOSIS — I10 ESSENTIAL HYPERTENSION: Primary | Chronic | ICD-10-CM

## 2022-08-25 DIAGNOSIS — I10 ESSENTIAL HYPERTENSION: ICD-10-CM

## 2022-08-25 LAB
ALBUMIN SERPL-MCNC: 4.4 G/DL (ref 3.5–5.2)
ALBUMIN/GLOB SERPL: 1.8 G/DL
ALP SERPL-CCNC: 62 U/L (ref 39–117)
ALT SERPL W P-5'-P-CCNC: 19 U/L (ref 1–41)
ANION GAP SERPL CALCULATED.3IONS-SCNC: 6.6 MMOL/L (ref 5–15)
AST SERPL-CCNC: 20 U/L (ref 1–40)
BILIRUB SERPL-MCNC: 0.3 MG/DL (ref 0–1.2)
BUN SERPL-MCNC: 17 MG/DL (ref 8–23)
BUN/CREAT SERPL: 18.7 (ref 7–25)
CALCIUM SPEC-SCNC: 9.5 MG/DL (ref 8.6–10.5)
CHLORIDE SERPL-SCNC: 104 MMOL/L (ref 98–107)
CHOLEST SERPL-MCNC: 203 MG/DL (ref 0–200)
CO2 SERPL-SCNC: 30.4 MMOL/L (ref 22–29)
CREAT SERPL-MCNC: 0.91 MG/DL (ref 0.76–1.27)
EGFRCR SERPLBLD CKD-EPI 2021: 93 ML/MIN/1.73
GLOBULIN UR ELPH-MCNC: 2.5 GM/DL
GLUCOSE SERPL-MCNC: 89 MG/DL (ref 65–99)
HDLC SERPL-MCNC: 50 MG/DL (ref 40–60)
LDLC SERPL CALC-MCNC: 131 MG/DL (ref 0–100)
LDLC/HDLC SERPL: 2.56 {RATIO}
POTASSIUM SERPL-SCNC: 4.7 MMOL/L (ref 3.5–5.2)
PROT SERPL-MCNC: 6.9 G/DL (ref 6–8.5)
SODIUM SERPL-SCNC: 141 MMOL/L (ref 136–145)
TRIGL SERPL-MCNC: 124 MG/DL (ref 0–150)
VLDLC SERPL-MCNC: 22 MG/DL (ref 5–40)

## 2022-08-25 PROCEDURE — 80061 LIPID PANEL: CPT

## 2022-08-25 PROCEDURE — 80053 COMPREHEN METABOLIC PANEL: CPT

## 2022-08-25 PROCEDURE — 99214 OFFICE O/P EST MOD 30 MIN: CPT | Performed by: STUDENT IN AN ORGANIZED HEALTH CARE EDUCATION/TRAINING PROGRAM

## 2022-08-25 RX ORDER — ATORVASTATIN CALCIUM 10 MG/1
10 TABLET, FILM COATED ORAL DAILY
Qty: 30 TABLET | Refills: 2 | Status: SHIPPED | OUTPATIENT
Start: 2022-08-25 | End: 2023-01-13 | Stop reason: SINTOL

## 2022-08-25 NOTE — PROGRESS NOTES
"Chief Complaint  Yovany Gonzalez is a 67 y.o. male presenting for Hypertension (F/u ).     From [place]. Moved to [place] 2013. Worked as  in the past, retired 2018. . No children.     Patient has a past medical history of ACTH deficiency with secondary adrenal insufficiency, hypertension (2022), hyperlipidemia, colon adenoma, cataracts and presbyopia.    History of Present Illness  Patient is here for follow-up of his hypertension.    He was started back on rosuvastatin at 10 mg, but was having a lot of leg pains.  He stopped taking it and after 2 days his pain went away.  He was off it for 2 weeks and restarted it and within a week the pain was back.  He never tried atorvastatin/Lipitor in the past and is amenable to trying.  He has a LDL level on file at 224, but he also has a level at 100, so clearly somewhat fluctuating.  However he does have xanthomas of his eyelids.    Patient also was found to have multiple lung nodules, he is due for repeat CT scan in September.    The following portions of the patient's history were reviewed and updated as appropriate: allergies, current medications, past family history, past medical history, past social history, past surgical history and problem list.    Objective  /74 (BP Location: Left arm, Patient Position: Sitting, Cuff Size: Adult)   Pulse 60   Temp 98.2 °F (36.8 °C) (Temporal)   Ht 175.3 cm (69.02\")   Wt 98 kg (216 lb)   BMI 31.88 kg/m²     Physical Exam  Constitutional:       Appearance: Normal appearance.   Eyes:      Extraocular Movements: Extraocular movements intact.      Conjunctiva/sclera: Conjunctivae normal.   Musculoskeletal:      Cervical back: Neck supple.   Neurological:      Mental Status: He is alert and oriented to person, place, and time. Mental status is at baseline.      Gait: Gait normal.   Psychiatric:         Behavior: Behavior normal.         Thought Content: Thought content normal.         Assessment/Plan "   1. Essential hypertension  BP Readings from Last 3 Encounters:   08/25/22 130/74   07/05/22 116/78   05/23/22 134/72   Blood pressure currently at goal.  Initial blood pressure elevated today, repeat normal.  Continue on current medications.  He will go for recheck of this CMP today.    2. Hypercholesterolemia with LDL greater than 190 mg/dL  The 10-year ASCVD risk score (Scarlet JEAN Jr., et al., 2013) is: 19.6%    Values used to calculate the score:      Age: 67 years      Sex: Male      Is Non- : No      Diabetic: No      Tobacco smoker: No      Systolic Blood Pressure: 130 mmHg      Is BP treated: Yes      HDL Cholesterol: 58 mg/dL      Total Cholesterol: 304 mg/dL  Patient is willing to do trial with atorvastatin, however if you again develop side effects that he currently had with rosuvastatin I would consider Zetia instead.  I have counseled patient on the reasoning behind cholesterol-lowering medications.  We do not look so much at the levels isolated anymore, will look more at the risk score.  However he does have an LDL on file over 190, for which high-dose/high intensity statin is recommended.  This is for long-term prevention of cardiovascular disease including stroke and heart attack.  Patient verbalizes understanding.  He will message me back if he starts to experience negative side effects with myalgia and we will transition to CT.  Likely this could be approved by insurance under the circumstances.  - Lipid Panel; Future    3. Multiple lung nodules on CT  Need for repeat CT scan after September 14.  - CT Chest Without Contrast; Future    4. Obesity (BMI 30.0-34.9)  BMI is >= 30 and <35. (Class 1 Obesity). The following options were offered after discussion;: exercise counseling/recommendations      Return in about 20 weeks (around 1/12/2023), or if symptoms worsen or fail to improve, for Recheck.    Future Appointments       Provider Department Center    1/13/2023 10:30 AM  Anthony Cramer MD [facility] INTERNAL MEDICINE [place]    5/24/2023 1:30 PM Xenia Bryson MD [facility] ENDOCRINOLOGY [place]          Anthony Cramer MD  Family Medicine  08/25/2022    Answers for HPI/ROS submitted by the patient on 8/18/2022  What is the primary reason for your visit?: Other  Please describe your symptoms.: Problems with statin.  Have you had these symptoms before?: Yes  How long have you been having these symptoms?: Greater than 2 weeks  Please describe any probable cause for these symptoms. : Statin

## 2022-09-12 DIAGNOSIS — I10 ESSENTIAL HYPERTENSION: Chronic | ICD-10-CM

## 2022-09-12 RX ORDER — LISINOPRIL 2.5 MG/1
2.5 TABLET ORAL DAILY
Qty: 90 TABLET | Refills: 1 | Status: SHIPPED | OUTPATIENT
Start: 2022-09-12 | End: 2023-01-20 | Stop reason: SDUPTHER

## 2022-09-12 RX ORDER — NIFEDIPINE 30 MG/1
30 TABLET, EXTENDED RELEASE ORAL DAILY
Qty: 90 TABLET | Refills: 1 | Status: SHIPPED | OUTPATIENT
Start: 2022-09-12 | End: 2022-09-14 | Stop reason: SDUPTHER

## 2022-09-12 NOTE — TELEPHONE ENCOUNTER
Rx Refill Note  Requested Prescriptions     Pending Prescriptions Disp Refills   • NIFEdipine XL (PROCARDIA XL) 30 MG 24 hr tablet       Sig: Take 1 tablet by mouth Daily. Takes 1/2 tablet daily   • lisinopril (PRINIVIL,ZESTRIL) 2.5 MG tablet 30 tablet 3     Sig: Take 1 tablet by mouth Daily.      Last office visit with prescribing clinician: 8/25/2022      Next office visit with prescribing clinician: 1/13/2023            Emy Arguelles LPN  09/12/22, 10:06 EDT

## 2022-09-12 NOTE — TELEPHONE ENCOUNTER
Caller: CarlosYovany    Relationship: Self    Best call back number: [phone]    Requested Prescriptions:   Requested Prescriptions     Pending Prescriptions Disp Refills   • NIFEdipine XL (PROCARDIA XL) 30 MG 24 hr tablet       Sig: Take 1 tablet by mouth Daily. Takes 1/2 tablet daily   • lisinopril (PRINIVIL,ZESTRIL) 2.5 MG tablet 30 tablet 3     Sig: Take 1 tablet by mouth Daily.        Pharmacy where request should be sent: [facility]/PHARMACY #[id number] [address] [phone] [place] [phone]      Additional details provided by patient:     Does the patient have less than a 3 day supply:  [x] Yes  [] No    Cyndee Yanez MA   09/12/22 09:49 EDT

## 2022-09-14 ENCOUNTER — TELEPHONE (OUTPATIENT)
Dept: INTERNAL MEDICINE | Facility: CLINIC | Age: 67
End: 2022-09-14

## 2022-09-14 RX ORDER — NIFEDIPINE 30 MG/1
30 TABLET, EXTENDED RELEASE ORAL DAILY
Qty: 90 TABLET | Refills: 1 | Status: SHIPPED | OUTPATIENT
Start: 2022-09-14 | End: 2023-01-20 | Stop reason: SINTOL

## 2022-09-14 NOTE — TELEPHONE ENCOUNTER
Pharmacy needs clarification on Nifedipine ER 30 mg tablets it has 2 sigs  Take 1 tablet by mouth daily. Takes 1/2 tablet daily

## 2022-09-15 ENCOUNTER — TELEPHONE (OUTPATIENT)
Dept: INTERNAL MEDICINE | Facility: CLINIC | Age: 67
End: 2022-09-15

## 2022-09-15 DIAGNOSIS — Z78.9 STATIN INTOLERANCE: ICD-10-CM

## 2022-09-15 DIAGNOSIS — E78.00 HYPERCHOLESTEROLEMIA WITH LDL GREATER THAN 190 MG/DL: Primary | ICD-10-CM

## 2022-09-15 RX ORDER — EZETIMIBE 10 MG/1
10 TABLET ORAL DAILY
Qty: 30 TABLET | Refills: 3 | Status: SHIPPED | OUTPATIENT
Start: 2022-09-15 | End: 2022-12-12

## 2022-09-15 NOTE — TELEPHONE ENCOUNTER
Patient has tried Crestor and Lipitor and is not tolerating any of them.  He is experiencing myalgias and gastrointestinal intolerance.  For that reason statin is not an option for him.  We will do trial of:    1. Hypercholesterolemia with LDL greater than 190 mg/dL  2. Statin intolerance  - ezetimibe (Zetia) 10 MG tablet; Take 1 tablet by mouth Daily.  Dispense: 30 tablet; Refill: 3    Anthony Cramer MD

## 2022-09-15 NOTE — TELEPHONE ENCOUNTER
Caller: Rajwinder Gonzalez    Relationship: Emergency Contact    Best call back number: [phone]    What medication are you requesting: ANYTHING TO HELP THAT ISN'T A STATIN FOR CHOLESTEROL    What are your current symptoms: UNABLE TO SLEEP, BODY ACHES, MUSCLE ACHES, NASAUES, SICK TO STOMACH.    Have you had these symptoms before:    [] Yes  [x] No    Have you been treated for these symptoms before:   [] Yes  [x] No    If a prescription is needed, what is your preferred pharmacy and phone number: [facility]/PHARMACY #[id number] - [place], [place] - [address] [place] [address] [phone] [facility] [phone]      Additional notes: PATIENT WANTS TO GET OFF OF THE STATIN MEDICATION DUE TO SIDE EFFECTS.

## 2022-09-20 ENCOUNTER — HOSPITAL ENCOUNTER (OUTPATIENT)
Dept: CT IMAGING | Facility: HOSPITAL | Age: 67
Discharge: HOME | End: 2022-09-20
Admitting: STUDENT IN AN ORGANIZED HEALTH CARE EDUCATION/TRAINING PROGRAM

## 2022-09-20 DIAGNOSIS — R91.8 MULTIPLE LUNG NODULES ON CT: ICD-10-CM

## 2022-09-20 PROCEDURE — 71250 CT THORAX DX C-: CPT

## 2022-12-12 DIAGNOSIS — E78.00 HYPERCHOLESTEROLEMIA WITH LDL GREATER THAN 190 MG/DL: ICD-10-CM

## 2022-12-12 RX ORDER — EZETIMIBE 10 MG/1
TABLET ORAL
Qty: 90 TABLET | Refills: 1 | Status: SHIPPED | OUTPATIENT
Start: 2022-12-12

## 2023-01-13 ENCOUNTER — LAB (OUTPATIENT)
Dept: LAB | Facility: HOSPITAL | Age: 68
End: 2023-01-13
Payer: MEDICARE

## 2023-01-13 ENCOUNTER — OFFICE VISIT (OUTPATIENT)
Dept: INTERNAL MEDICINE | Facility: CLINIC | Age: 68
End: 2023-01-13
Payer: MEDICARE

## 2023-01-13 VITALS
BODY MASS INDEX: 32.67 KG/M2 | HEART RATE: 56 BPM | DIASTOLIC BLOOD PRESSURE: 76 MMHG | HEIGHT: 69 IN | SYSTOLIC BLOOD PRESSURE: 124 MMHG | TEMPERATURE: 98.9 F | WEIGHT: 220.6 LBS

## 2023-01-13 DIAGNOSIS — R53.83 FATIGUE, UNSPECIFIED TYPE: Primary | ICD-10-CM

## 2023-01-13 DIAGNOSIS — Z78.9 STATIN INTOLERANCE: ICD-10-CM

## 2023-01-13 DIAGNOSIS — R53.83 FATIGUE, UNSPECIFIED TYPE: ICD-10-CM

## 2023-01-13 DIAGNOSIS — E27.49 SECONDARY ADRENAL INSUFFICIENCY: Chronic | ICD-10-CM

## 2023-01-13 DIAGNOSIS — E23.6 ACTH DEFICIENCY: Chronic | ICD-10-CM

## 2023-01-13 DIAGNOSIS — I10 ESSENTIAL HYPERTENSION: Chronic | ICD-10-CM

## 2023-01-13 DIAGNOSIS — E78.00 HYPERCHOLESTEROLEMIA WITH LDL GREATER THAN 190 MG/DL: Chronic | ICD-10-CM

## 2023-01-13 LAB
ALBUMIN SERPL-MCNC: 4.3 G/DL (ref 3.5–5.2)
ALBUMIN/GLOB SERPL: 1.6 G/DL
ALP SERPL-CCNC: 67 U/L (ref 39–117)
ALT SERPL W P-5'-P-CCNC: 41 U/L (ref 1–41)
ANION GAP SERPL CALCULATED.3IONS-SCNC: 8 MMOL/L (ref 5–15)
AST SERPL-CCNC: 38 U/L (ref 1–40)
BILIRUB SERPL-MCNC: <0.2 MG/DL (ref 0–1.2)
BUN SERPL-MCNC: 22 MG/DL (ref 8–23)
BUN/CREAT SERPL: 22.4 (ref 7–25)
CALCIUM SPEC-SCNC: 9.6 MG/DL (ref 8.6–10.5)
CHLORIDE SERPL-SCNC: 103 MMOL/L (ref 98–107)
CO2 SERPL-SCNC: 31 MMOL/L (ref 22–29)
CREAT SERPL-MCNC: 0.98 MG/DL (ref 0.76–1.27)
DEPRECATED RDW RBC AUTO: 41.2 FL (ref 37–54)
EGFRCR SERPLBLD CKD-EPI 2021: 84.5 ML/MIN/1.73
ERYTHROCYTE [DISTWIDTH] IN BLOOD BY AUTOMATED COUNT: 13.5 % (ref 12.3–15.4)
GLOBULIN UR ELPH-MCNC: 2.7 GM/DL
GLUCOSE SERPL-MCNC: 80 MG/DL (ref 65–99)
HCT VFR BLD AUTO: 40.2 % (ref 37.5–51)
HGB BLD-MCNC: 13 G/DL (ref 13–17.7)
MCH RBC QN AUTO: 27.3 PG (ref 26.6–33)
MCHC RBC AUTO-ENTMCNC: 32.3 G/DL (ref 31.5–35.7)
MCV RBC AUTO: 84.5 FL (ref 79–97)
PLATELET # BLD AUTO: 228 10*3/MM3 (ref 140–450)
PMV BLD AUTO: 11.1 FL (ref 6–12)
POTASSIUM SERPL-SCNC: 4.4 MMOL/L (ref 3.5–5.2)
PROT SERPL-MCNC: 7 G/DL (ref 6–8.5)
RBC # BLD AUTO: 4.76 10*6/MM3 (ref 4.14–5.8)
SODIUM SERPL-SCNC: 142 MMOL/L (ref 136–145)
TSH SERPL DL<=0.05 MIU/L-ACNC: 1.63 UIU/ML (ref 0.27–4.2)
WBC NRBC COR # BLD: 10.59 10*3/MM3 (ref 3.4–10.8)

## 2023-01-13 PROCEDURE — 80053 COMPREHEN METABOLIC PANEL: CPT

## 2023-01-13 PROCEDURE — 84443 ASSAY THYROID STIM HORMONE: CPT

## 2023-01-13 PROCEDURE — 82607 VITAMIN B-12: CPT

## 2023-01-13 PROCEDURE — 85027 COMPLETE CBC AUTOMATED: CPT

## 2023-01-13 PROCEDURE — 99214 OFFICE O/P EST MOD 30 MIN: CPT | Performed by: STUDENT IN AN ORGANIZED HEALTH CARE EDUCATION/TRAINING PROGRAM

## 2023-01-13 NOTE — PROGRESS NOTES
"SomeChi Complaint  Yovany Gonzalez is a 68 y.o. male presenting for Hypertension (F/u) and Back Pain.     From [place]. Moved to [place] 2013. Worked as  in the past, retired 2018. . No children.     Patient has a past medical history of ACTH deficiency with secondary adrenal insufficiency, hypertension (2022), hyperlipidemia, colon adenoma, cataracts and presbyopia.    History of Present Illness  Patient is here for follow-up of his hypertension.  He also back pain without injury, but feels it is resolving at this point.    Of note he has been on atorvastatin and rosuvastatin and did not tolerate.  We started him on Zetia and he is tolerating well.    Patient reports he has noticed some flushing of the chest and the face at times, also feels some fatigue.  He is wondering if this could be caused by his medications.    Patient plans to go to [place] for some weeks, he will return in March.    The following portions of the patient's history were reviewed and updated as appropriate: allergies, current medications, past family history, past medical history, past social history, past surgical history and problem list.    Objective  /76 (BP Location: Left arm, Patient Position: Sitting, Cuff Size: Adult)   Pulse 56   Temp 98.9 °F (37.2 °C) (Temporal)   Ht 175.3 cm (69.02\")   Wt 100 kg (220 lb 9.6 oz)   BMI 32.56 kg/m²     Physical Exam  Constitutional:       Appearance: Normal appearance.   HENT:      Nose: No congestion.   Eyes:      Extraocular Movements: Extraocular movements intact.      Conjunctiva/sclera: Conjunctivae normal.   Cardiovascular:      Rate and Rhythm: Normal rate and regular rhythm.      Heart sounds: Normal heart sounds. No murmur heard.  Pulmonary:      Effort: Pulmonary effort is normal. No respiratory distress.      Breath sounds: Normal breath sounds.   Musculoskeletal:      Right lower leg: No edema.      Left lower leg: No edema.   Neurological:      Mental " Status: He is alert and oriented to person, place, and time. Mental status is at baseline.      Gait: Gait normal.   Psychiatric:         Behavior: Behavior normal.         Thought Content: Thought content normal.         Assessment/Plan   1. Essential hypertension  BP Readings from Last 3 Encounters:   01/13/23 124/76   08/25/22 130/74   07/05/22 116/78   His blood pressure is at goal, however nifedipine can cause flushing and may be because of his symptoms.  He is currently on 30 mg, which we previously had increased from half a tablet daily.  I have suggested to do some blood work and potentially discontinue the nifedipine and increase his lisinopril.  He is on lisinopril 2.5 mg, we would increase it at least to 10 mg, may be need to go up to 10 or 40 mg for blood pressure control on monotherapy.  Patient will think about it, he has a home blood pressure monitor if we need to titrate.  - Comprehensive Metabolic Panel; Future    2. Hypercholesterolemia with LDL greater than 190 mg/dL  3. Statin intolerance  Tolerating Zetia.  Continue on current medication    4. Fatigue, unspecified type  - TSH Rfx On Abnormal To Free T4; Future  - CBC (No Diff); Future  - Vitamin B12; Future    5. ACTH deficiency (HCC)  6. Secondary adrenal insufficiency (HCC)  Patient will reach out to the endocrinologist at [facility] given his fatigue and flushing.      Return in about 10 weeks (around 3/24/2023) for Recheck.    Future Appointments       Provider Department Center    3/24/2023 11:00 AM Anthony Cramer MD [facility] INTERNAL MEDICINE [place]    5/24/2023 1:30 PM Xenia Bryson MD [facility] ENDOCRINOLOGY [place]          Anthony Cramer MD  Family Medicine  01/13/2023    Answers for HPI/ROS submitted by the patient on 1/6/2023  What is the primary reason for your visit?: Physical

## 2023-01-14 LAB — VIT B12 BLD-MCNC: 477 PG/ML (ref 211–946)

## 2023-01-19 ENCOUNTER — TELEPHONE (OUTPATIENT)
Dept: INTERNAL MEDICINE | Facility: CLINIC | Age: 68
End: 2023-01-19
Payer: MEDICARE

## 2023-01-19 DIAGNOSIS — I10 ESSENTIAL HYPERTENSION: Chronic | ICD-10-CM

## 2023-01-19 NOTE — TELEPHONE ENCOUNTER
Caller: Yovany Gonzalez    Relationship: Self    Best call back number: [phone]    What medications are you currently taking:   Current Outpatient Medications on File Prior to Visit   Medication Sig Dispense Refill   • cholecalciferol (VITAMIN D3) 25 MCG (1000 UT) tablet Take 2,000 Units by mouth Daily.     • ezetimibe (ZETIA) 10 MG tablet TAKE 1 TABLET BY MOUTH EVERY DAY 90 tablet 1   • lisinopril (PRINIVIL,ZESTRIL) 2.5 MG tablet Take 1 tablet by mouth Daily. 90 tablet 1   • multivitamins-minerals (PRESERVISION AREDS 2) capsule capsule Take 1 capsule by mouth 2 (Two) Times a Day.     • NIFEdipine XL (PROCARDIA XL) 30 MG 24 hr tablet Take 1 tablet by mouth Daily. 90 tablet 1   • predniSONE (DELTASONE) 5 MG tablet Take 5 mg PO every morning plus extra as directed for illness, up to 5 extra tabs per month  Indications: Decreased Function of the Adrenal Gland 105 tablet 3     No current facility-administered medications on file prior to visit.          When did you start taking these medications: 1 YEAR AGO    Which medication are you concerned about:   NIFEDIPINE  Who prescribed you this medication: HOLESTOL    What are your concerns: PATIENT IS GETTING UP ALL NIGHT, FEELS LIKE HE IS SUNBURNED ALL THE TIME, FLUSH. PATIENT STOPPED TAKING IT 1 NIGHT AND FELT FINE. THEN WENT BACK ON LIKE HALF A PILL AND FELT OK AND WENT BACK TO WHOLE PILL AND SAME SYMPTOMS. BLOOD PRESSURE IS GREAT HOWEVER NOT THE SIDE EFFECTS. PLEASE ADVISE

## 2023-01-20 RX ORDER — LISINOPRIL 10 MG/1
10 TABLET ORAL DAILY
Qty: 90 TABLET | Refills: 0 | Status: SHIPPED | OUTPATIENT
Start: 2023-01-20 | End: 2023-01-31 | Stop reason: SDUPTHER

## 2023-01-20 NOTE — TELEPHONE ENCOUNTER
See note below.  Patient appears intolerant.  I recommend discontinuing the nifedipine ER entirely and increasing lisinopril from 2.5mg to 10 mg as first step.  I believe he has a home blood pressure monitor, so I would ask that he checks his blood pressure regularly at least once or twice daily over the next 1 to 2 weeks, and if blood pressure would trend higher than 140/90 we should increase it to 20 mg, we might need to increase it further.  I would ask that he message me back in about 1 week with updated blood pressures, sooner if blood pressures are elevated.  I have sent new prescription to his pharmacy.    1. Essential hypertension  - lisinopril (PRINIVIL,ZESTRIL) 10 MG tablet; Take 1 tablet by mouth Daily.  Dispense: 90 tablet; Refill: 0    Anthony Cramer MD

## 2023-01-22 DIAGNOSIS — E27.49 SECONDARY ADRENAL INSUFFICIENCY: ICD-10-CM

## 2023-01-23 RX ORDER — PREDNISONE 1 MG/1
TABLET ORAL
Qty: 105 TABLET | Refills: 2 | Status: SHIPPED | OUTPATIENT
Start: 2023-01-23

## 2023-01-31 ENCOUNTER — TELEPHONE (OUTPATIENT)
Dept: INTERNAL MEDICINE | Facility: CLINIC | Age: 68
End: 2023-01-31
Payer: MEDICARE

## 2023-01-31 DIAGNOSIS — I10 ESSENTIAL HYPERTENSION: Chronic | ICD-10-CM

## 2023-01-31 RX ORDER — LISINOPRIL 20 MG/1
20 TABLET ORAL DAILY
Qty: 90 TABLET | Refills: 0 | Status: SHIPPED | OUTPATIENT
Start: 2023-01-31 | End: 2023-02-10 | Stop reason: SDUPTHER

## 2023-02-10 ENCOUNTER — TELEPHONE (OUTPATIENT)
Dept: INTERNAL MEDICINE | Facility: CLINIC | Age: 68
End: 2023-02-10
Payer: MEDICARE

## 2023-02-10 DIAGNOSIS — I10 ESSENTIAL HYPERTENSION: Chronic | ICD-10-CM

## 2023-02-10 RX ORDER — LISINOPRIL 40 MG/1
40 TABLET ORAL DAILY
Qty: 90 TABLET | Refills: 0 | Status: SHIPPED | OUTPATIENT
Start: 2023-02-10 | End: 2023-03-24 | Stop reason: ALTCHOICE

## 2023-02-10 NOTE — TELEPHONE ENCOUNTER
BLOOD PRESSURE IS STILL RUNNING HIGH WITH THE NEW MEDICATION.  HIGHEST HAS BEEN 161/87.    PT WANTS TO HAVE A CALL BACK. [phone].

## 2023-02-10 NOTE — TELEPHONE ENCOUNTER
I recommend increasing Lisinopril from 20 to 40 mg. Just start taking 2 tablets 20 mg at a time. I have sent in new Rx 40mg. Follow up with me as planned in March

## 2023-02-10 NOTE — TELEPHONE ENCOUNTER
BLOOD PRESSURE IS STILL RUNNING HIGH WITH THE NEW MEDICATION.  HIGHEST HAS BEEN 161/87.     PT WANTS TO HAVE A CALL BACK. [phone].

## 2023-02-10 NOTE — TELEPHONE ENCOUNTER
I spoke with patient.  States BP still a little high.  140-160/mid to high 80s.   Taking Lisinopril 20 mg at night.   Please advise

## 2023-03-08 RX ORDER — NIFEDIPINE 30 MG/1
TABLET, EXTENDED RELEASE ORAL
Qty: 90 TABLET | Refills: 1 | OUTPATIENT
Start: 2023-03-08

## 2023-03-08 NOTE — TELEPHONE ENCOUNTER
Rx Refill Note  Requested Prescriptions     Pending Prescriptions Disp Refills   • NIFEdipine XL (PROCARDIA XL) 30 MG 24 hr tablet [Pharmacy Med Name: NIFEDIPINE ER 30 MG TABLET] 90 tablet 1     Sig: TAKE 1 TABLET BY MOUTH EVERY DAY      Last office visit with prescribing clinician: 1/13/2023   Last telemedicine visit with prescribing clinician: 3/24/2023   Next office visit with prescribing clinician: 3/24/2023                         Would you like a call back once the refill request has been completed: [] Yes [] No    If the office needs to give you a call back, can they leave a voicemail: [] Yes [] No    Kavitha Reis LPN  03/08/23, 07:37 EST

## 2023-03-24 ENCOUNTER — OFFICE VISIT (OUTPATIENT)
Dept: INTERNAL MEDICINE | Facility: CLINIC | Age: 68
End: 2023-03-24
Payer: MEDICARE

## 2023-03-24 VITALS
WEIGHT: 221.8 LBS | SYSTOLIC BLOOD PRESSURE: 150 MMHG | TEMPERATURE: 97.8 F | BODY MASS INDEX: 32.85 KG/M2 | HEIGHT: 69 IN | HEART RATE: 60 BPM | DIASTOLIC BLOOD PRESSURE: 90 MMHG

## 2023-03-24 DIAGNOSIS — I10 ESSENTIAL HYPERTENSION: Primary | Chronic | ICD-10-CM

## 2023-03-24 DIAGNOSIS — E78.00 HYPERCHOLESTEROLEMIA WITH LDL GREATER THAN 190 MG/DL: Chronic | ICD-10-CM

## 2023-03-24 DIAGNOSIS — Z23 NEED FOR PNEUMOCOCCAL VACCINE: ICD-10-CM

## 2023-03-24 RX ORDER — LISINOPRIL AND HYDROCHLOROTHIAZIDE 20; 12.5 MG/1; MG/1
1 TABLET ORAL DAILY
Qty: 90 TABLET | Refills: 0 | Status: SHIPPED | OUTPATIENT
Start: 2023-03-24

## 2023-03-24 NOTE — PROGRESS NOTES
"Chief Complaint  Yovany Gonzalez is a 68 y.o. male presenting for Hypertension (F/u).     From [place]. Moved to [place] 2013. Worked as  in the past, retired 2018. . No children.     Patient has a past medical history of ACTH deficiency with secondary adrenal insufficiency, hypertension (2022), hyperlipidemia, colon adenoma, cataracts and presbyopia.    History of Present Illness  Patient is here for follow-up of his hypertension.  He did increase lisinopril to 40 mg, but states he was having loose bowels and also was getting up at night up to 4-5 times.  His baseline nocturia is 0-1.  Since reducing his dose again to 20 mg his side effects have subsided.  His home BP typically now ranging around 140s/90s.    Patient also feels some tingling of the hands and feet at times.  He is wondering if this might be related to his medication.  Did not tolerate rosuvastatin/Crestor or atorvastatin/Lipitor, but appears to be tolerating Zetia.    The following portions of the patient's history were reviewed and updated as appropriate: allergies, current medications, past family history, past medical history, past social history, past surgical history and problem list.    Objective  /90 (BP Location: Left arm, Patient Position: Sitting, Cuff Size: Large Adult)   Pulse 60   Temp 97.8 °F (36.6 °C) (Temporal)   Ht 175.3 cm (69.02\")   Wt 101 kg (221 lb 12.8 oz)   BMI 32.74 kg/m²     Physical Exam  Constitutional:       Appearance: Normal appearance.   HENT:      Head: Normocephalic and atraumatic.   Eyes:      Extraocular Movements: Extraocular movements intact.      Conjunctiva/sclera: Conjunctivae normal.   Pulmonary:      Effort: Pulmonary effort is normal. No respiratory distress.   Musculoskeletal:      Cervical back: Neck supple.   Neurological:      Mental Status: He is alert and oriented to person, place, and time. Mental status is at baseline.   Psychiatric:         Behavior: Behavior " normal.         Thought Content: Thought content normal.         Assessment/Plan   1. Essential hypertension  BP Readings from Last 3 Encounters:   03/24/23 150/90   01/13/23 124/76   08/25/22 130/74   Blood pressure currently not well controlled.  Possible side effect of lisinopril higher dose, but loose bowels is unusual.  We will try to add hydrochlorothiazide, and I recommend he takes this medication in the morning.  He has no history of gout.  We will do repeat CMP before follow-up visit in about a month with annual Medicare visit  - lisinopril-hydrochlorothiazide (PRINZIDE,ZESTORETIC) 20-12.5 MG per tablet; Take 1 tablet by mouth Daily.  Dispense: 90 tablet; Refill: 0  - Comprehensive Metabolic Panel; Future    2. Hypercholesterolemia with LDL greater than 190 mg/dL  Cannot rule out side effect of Zetia, if anything he could try off the medication for couple of weeks and see if it changes anything.  Otherwise we will recheck lipids before next visit.  - Lipid Panel; Future    3. Need for pneumococcal vaccine  Administered today  - Pneumococcal Conjugate Vaccine 20-Valent All      Return in about 1 month (around 4/25/2023) for Medicare Wellness.    Future Appointments       Provider Department Center    5/2/2023 10:45 AM Anthony Cramer MD [facility] INTERNAL MEDICINE [place]    5/24/2023 1:30 PM Xenia Bryson MD [facility] ENDOCRINOLOGY [place]          Anthony Cramer MD  Family Medicine  03/24/2023

## 2023-04-07 ENCOUNTER — TELEPHONE (OUTPATIENT)
Dept: INTERNAL MEDICINE | Facility: CLINIC | Age: 68
End: 2023-04-07

## 2023-04-07 NOTE — TELEPHONE ENCOUNTER
Caller: Yovany Gonzalez    Relationship: Self    Best call back number: [phone]    What orders are you requesting (i.e. lab or imaging): LABS     In what timeframe would the patient need to come in: WEEK OF 4/22/23    Where will you receive your lab/imaging services:   [address] [place] [id number]

## 2023-04-20 ENCOUNTER — TELEPHONE (OUTPATIENT)
Dept: INTERNAL MEDICINE | Facility: CLINIC | Age: 68
End: 2023-04-20
Payer: MEDICARE

## 2023-04-20 NOTE — TELEPHONE ENCOUNTER
PT CALLED AND STATED HE WENT TO [facility] IN [place] AND WAITED 2 HOURS TO HAVE LABS DRAWN.    I APOLOGIZED AND LET THE PT KNOW THAT I WOULD FAX THE LABS OVER ASAP [phone] (FAX NUMBER).

## 2023-04-21 LAB
ALBUMIN SERPL-MCNC: 3.6 G/DL (ref 3.5–5.2)
ALBUMIN/GLOB SERPL: 1.4 G/DL
ALP SERPL-CCNC: 63 U/L (ref 39–117)
ALT SERPL-CCNC: 28 U/L (ref 1–41)
AST SERPL-CCNC: 17 U/L (ref 1–40)
BILIRUB SERPL-MCNC: 0.3 MG/DL (ref 0–1.2)
BUN SERPL-MCNC: 21 MG/DL (ref 8–23)
BUN/CREAT SERPL: 20.8 (ref 7–25)
CALCIUM SERPL-MCNC: 9.8 MG/DL (ref 8.6–10.5)
CHLORIDE SERPL-SCNC: 106 MMOL/L (ref 98–107)
CHOLEST SERPL-MCNC: 186 MG/DL (ref 0–200)
CO2 SERPL-SCNC: 31.6 MMOL/L (ref 22–29)
CREAT SERPL-MCNC: 1.01 MG/DL (ref 0.76–1.27)
EGFRCR SERPLBLD CKD-EPI 2021: 81.5 ML/MIN/1.73
GLOBULIN SER CALC-MCNC: 2.5 GM/DL
GLUCOSE SERPL-MCNC: 83 MG/DL (ref 65–99)
HDLC SERPL-MCNC: 51 MG/DL (ref 40–60)
LDLC SERPL CALC-MCNC: 115 MG/DL (ref 0–100)
POTASSIUM SERPL-SCNC: 4.4 MMOL/L (ref 3.5–5.2)
PROT SERPL-MCNC: 6.1 G/DL (ref 6–8.5)
SODIUM SERPL-SCNC: 145 MMOL/L (ref 136–145)
TRIGL SERPL-MCNC: 110 MG/DL (ref 0–150)
VLDLC SERPL CALC-MCNC: 20 MG/DL (ref 5–40)

## 2023-04-22 DIAGNOSIS — I10 ESSENTIAL HYPERTENSION: Chronic | ICD-10-CM

## 2023-04-24 RX ORDER — LISINOPRIL 10 MG/1
TABLET ORAL
Qty: 90 TABLET | Refills: 0 | OUTPATIENT
Start: 2023-04-24

## 2023-04-24 NOTE — TELEPHONE ENCOUNTER
Rx Refill Note  Requested Prescriptions     Pending Prescriptions Disp Refills   • lisinopril (PRINIVIL,ZESTRIL) 10 MG tablet [Pharmacy Med Name: LISINOPRIL 10 MG TABLET] 90 tablet 0     Sig: TAKE 1 TABLET BY MOUTH EVERY DAY      Last office visit with prescribing clinician: 3/24/2023   Last telemedicine visit with prescribing clinician: 5/2/2023   Next office visit with prescribing clinician: 5/2/2023                         Would you like a call back once the refill request has been completed: [] Yes [] No    If the office needs to give you a call back, can they leave a voicemail: [] Yes [] No    Kavitha Reis LPN  04/24/23, 08:02 EDT   Otis Pandya(Attending)

## 2023-04-28 DIAGNOSIS — I10 ESSENTIAL HYPERTENSION: Chronic | ICD-10-CM

## 2023-04-28 RX ORDER — LISINOPRIL 20 MG/1
TABLET ORAL
Qty: 90 TABLET | Refills: 0 | OUTPATIENT
Start: 2023-04-28

## 2023-04-28 NOTE — TELEPHONE ENCOUNTER
Rx Refill Note  Requested Prescriptions     Pending Prescriptions Disp Refills   • lisinopril (PRINIVIL,ZESTRIL) 20 MG tablet [Pharmacy Med Name: LISINOPRIL 20 MG TABLET] 90 tablet 0     Sig: TAKE 1 TABLET BY MOUTH EVERY DAY      Last office visit with prescribing clinician: 3/24/2023   Last telemedicine visit with prescribing clinician: 5/2/2023   Next office visit with prescribing clinician: 5/2/2023                         Would you like a call back once the refill request has been completed: [] Yes [] No    If the office needs to give you a call back, can they leave a voicemail: [] Yes [] No    Kavitha Reis LPN  04/28/23, 07:36 EDT

## 2023-05-02 ENCOUNTER — OFFICE VISIT (OUTPATIENT)
Dept: INTERNAL MEDICINE | Facility: CLINIC | Age: 68
End: 2023-05-02
Payer: MEDICARE

## 2023-05-02 VITALS
DIASTOLIC BLOOD PRESSURE: 86 MMHG | SYSTOLIC BLOOD PRESSURE: 136 MMHG | HEIGHT: 69 IN | TEMPERATURE: 97.8 F | BODY MASS INDEX: 33.15 KG/M2 | HEART RATE: 60 BPM | WEIGHT: 223.8 LBS

## 2023-05-02 DIAGNOSIS — Z00.00 ENCOUNTER FOR SUBSEQUENT ANNUAL WELLNESS VISIT (AWV) IN MEDICARE PATIENT: Primary | ICD-10-CM

## 2023-05-02 DIAGNOSIS — I10 ESSENTIAL HYPERTENSION: Chronic | ICD-10-CM

## 2023-05-02 DIAGNOSIS — E66.9 OBESITY (BMI 30.0-34.9): ICD-10-CM

## 2023-05-02 DIAGNOSIS — E78.00 HYPERCHOLESTEROLEMIA WITH LDL GREATER THAN 190 MG/DL: Chronic | ICD-10-CM

## 2023-05-02 RX ORDER — LISINOPRIL AND HYDROCHLOROTHIAZIDE 25; 20 MG/1; MG/1
1 TABLET ORAL DAILY
Qty: 90 TABLET | Refills: 1 | Status: SHIPPED | OUTPATIENT
Start: 2023-05-02 | End: 2023-05-02

## 2023-05-02 RX ORDER — LISINOPRIL AND HYDROCHLOROTHIAZIDE 25; 20 MG/1; MG/1
1 TABLET ORAL DAILY
Qty: 90 TABLET | Refills: 1 | Status: SHIPPED | OUTPATIENT
Start: 2023-05-02

## 2023-05-02 NOTE — PROGRESS NOTES
QUICK REFERENCE INFORMATION:  The ABCs of the Annual Wellness Visit    Subsequent Medicare Wellness Visit    From [place]. Moved to [place] . Worked as  in the past, retired . . No children.     Patient has a past medical history of ACTH deficiency with secondary adrenal insufficiency, hypertension (), hyperlipidemia, colon adenoma, cataracts and presbyopia.    Patient is here for annual Medicare visit.  He is overall doing well.  He did tolerate the hydrochlorothiazide that was added to his lisinopril 20 mg.  His home blood pressures are improved, see image below.    Patient also reports issues with itching of the skin when sweating, typically outside in the summer when it is warm.  He has not tried anything for this.  This has been ongoing for years.              HEALTH RISK ASSESSMENT    1955    Recent Hospitalizations:  No hospitalization(s) within the last year..        Current Medical Providers:  Patient Care Team:  Anthony Cramer MD as PCP - General (Family Medicine)  Elmo Sanchez MD as Consulting Physician (General Surgery)  Xenia Bryson MD as Consulting Physician (Endocrinology)        Smoking Status:  Social History     Tobacco Use   Smoking Status Former   • Packs/day: 0.50   • Years: 40.00   • Pack years: 20.00   • Types: Cigarettes   • Quit date: 2022   • Years since quittin.2   Smokeless Tobacco Never   Tobacco Comments    Quit 2022       Alcohol Consumption:  Social History     Substance and Sexual Activity   Alcohol Use Not Currently    Comment: very rarely       Depression Screen:       2023    11:10 AM   PHQ-2/PHQ-9 Depression Screening   Little Interest or Pleasure in Doing Things 1-->several days   Feeling Down, Depressed or Hopeless 0-->not at all   PHQ-9: Brief Depression Severity Measure Score 1       Health Habits and Functional and Cognitive Screenin/2/2023    11:10 AM   Functional & Cognitive Status   Do you have  difficulty preparing food and eating? No   Do you have difficulty bathing yourself, getting dressed or grooming yourself? No   Do you have difficulty using the toilet? No   Do you have difficulty moving around from place to place? No   Do you have trouble with steps or getting out of a bed or a chair? No   Current Diet Well Balanced Diet   Dental Exam Up to date   Eye Exam Up to date   Exercise (times per week) 4 times per week   Current Exercises Include Walking   Do you need help using the phone?  No   Are you deaf or do you have serious difficulty hearing?  Yes   Do you need help with transportation? No   Do you need help shopping? No   Do you need help preparing meals?  No   Do you need help with housework?  No   Do you need help with laundry? No   Do you need help taking your medications? No   Do you need help managing money? No   Do you ever drive or ride in a car without wearing a seat belt? No   Have you felt unusual stress, anger or loneliness in the last month? No   Who do you live with? Spouse   If you need help, do you have trouble finding someone available to you? No   Have you been bothered in the last four weeks by sexual problems? No   Do you have difficulty concentrating, remembering or making decisions? No       Fall Risk Screen:  [facility], and patient is at LOW risk for falls.Assessment completed on:5/2/2023    ACE III MINI        Does the patient have evidence of cognitive impairment? No    Aspirin use counseling: Does not need ASA (and currently is not on it)    Recent Lab Results:  CMP:  Lab Results   Component Value Date    BUN 21 04/21/2023    CREATININE 1.01 04/21/2023    EGFRIFNONA 76 12/26/2017    EGFRIFAFRI 92 12/26/2017    BCR 20.8 04/21/2023     04/21/2023    K 4.4 04/21/2023    CO2 31.6 (H) 04/21/2023    CALCIUM 9.8 04/21/2023    PROTENTOTREF 6.1 04/21/2023    ALBUMIN 3.6 04/21/2023    LABGLOBREF 2.5 04/21/2023    LABIL2 1.4 04/21/2023    BILITOT  0.3 04/21/2023    ALKPHOS 63 04/21/2023    AST 17 04/21/2023    ALT 28 04/21/2023     HbA1c:  Lab Results   Component Value Date    HGBA1C 5.3 12/29/2020    HGBA1C 5.30 12/26/2017     Microalbumin:  No results found for: MICROALBUR, POCMALB, POCCREAT  Lipid Panel  Lab Results   Component Value Date    CHOL 203 (H) 08/25/2022    TRIG 110 04/21/2023    HDL 51 04/21/2023     (H) 04/21/2023    AST 17 04/21/2023    ALT 28 04/21/2023       Visual Acuity:  No results found.    Age-appropriate Screening Schedule:  Refer to the list below for future screening recommendations based on patient's age, sex and/or medical conditions. Orders for these recommended tests are listed in the plan section. The patient has been provided with a written plan.    Health Maintenance   Topic Date Due   • INFLUENZA VACCINE  08/01/2023   • LUNG CANCER SCREENING  09/20/2023   • LIPID PANEL  04/21/2024   • ANNUAL WELLNESS VISIT  05/02/2024   • COLORECTAL CANCER SCREENING  08/20/2024   • TDAP/TD VACCINES (2 - Td or Tdap) 09/10/2031   • HEPATITIS C SCREENING  Completed   • COVID-19 Vaccine  Completed   • Pneumococcal Vaccine 65+  Completed   • AAA SCREEN (ONE-TIME)  Completed   • ZOSTER VACCINE  Completed        Subjective   History of Present Illness    Yovany Gonzalez is a 68 y.o. male who presents for a Subsequent Wellness Visit.    CHRONIC CONDITIONS    The following portions of the patient's history were reviewed and updated as appropriate: allergies, current medications, past family history, past medical history, past social history, past surgical history and problem list.    Outpatient Medications Prior to Visit   Medication Sig Dispense Refill   • cholecalciferol (VITAMIN D3) 25 MCG (1000 UT) tablet Take 1 tablet by mouth Daily.     • ezetimibe (ZETIA) 10 MG tablet TAKE 1 TABLET BY MOUTH EVERY DAY 90 tablet 1   • multivitamins-minerals (PRESERVISION AREDS 2) capsule capsule Take 1 capsule by mouth 2 (Two) Times a Day.     •  predniSONE (DELTASONE) 5 MG tablet TAKE 1 TABLET BY MOUTH IN THE MORNING PLUS EXTRA AS DIRECTED FOR ILLNESS, MAX EXTRA 5 TABS PER MONTH 105 tablet 2   • lisinopril-hydrochlorothiazide (PRINZIDE,ZESTORETIC) 20-12.5 MG per tablet Take 1 tablet by mouth Daily. 90 tablet 0     No facility-administered medications prior to visit.       Patient Active Problem List   Diagnosis   • Secondary adrenal insufficiency (HCC)   • Dyslipidemia   • Tubular adenoma of colon   • ACTH deficiency   • Bilateral presbyopia   • Cataract, nuclear sclerotic, both eyes   • Essential hypertension   • Multiple lung nodules on CT   • Cardiomegaly   • LVH (left ventricular hypertrophy) due to hypertensive disease, without heart failure   • Xanthelasma of eyelid, bilateral   • Tobacco abuse   • Obesity (BMI 30.0-34.9)   • Statin intolerance   • Hypercholesterolemia with LDL greater than 190 mg/dL       Advance Care Planning:  ACP discussion was held with the patient during this visit. Patient does not have an advance directive, information provided.    Identification of Risk Factors:  Risk factors include: Advance Directive Discussion  Obesity/Overweight .    Review of Systems    Compared to one year ago, the patient feels his physical health is the same.  Compared to one year ago, the patient feels his mental health is the same.    Objective     Physical Exam  Constitutional:       Appearance: Normal appearance.   HENT:      Head: Normocephalic and atraumatic.   Eyes:      Extraocular Movements: Extraocular movements intact.      Conjunctiva/sclera: Conjunctivae normal.   Pulmonary:      Effort: Pulmonary effort is normal. No respiratory distress.   Musculoskeletal:      Cervical back: Neck supple.   Skin:     General: Skin is warm and dry.      Findings: No rash.   Neurological:      Mental Status: He is alert and oriented to person, place, and time. Mental status is at baseline.   Psychiatric:         Behavior: Behavior normal.         Thought  "Content: Thought content normal.          Procedures     Vitals:    05/02/23 1106 05/02/23 1112 05/02/23 1135   BP: 144/90 134/90 136/86   BP Location: Left arm Left arm Left arm   Patient Position: Sitting Sitting Sitting   Cuff Size: Adult Adult Adult   Pulse: 60     Temp: 97.8 °F (36.6 °C)     TempSrc: Temporal     Weight: 102 kg (223 lb 12.8 oz)     Height: 176 cm (69.29\")         BMI is >= 30 and <35. (Class 1 Obesity). The following options were offered after discussion;: exercise counseling/recommendations and nutrition counseling/recommendations      Assessment & Plan     1. Encounter for subsequent annual wellness visit (AWV) in Medicare patient  For itching and skin reaction that may be it related rash I recommend trying over-the-counter Allegra, Zyrtec/cetirizine or loratadine/Claritin for symptom relief.    2. Hypercholesterolemia with LDL greater than 190 mg/dL  The 10-year ASCVD risk score (Cordelia RODRIGUEZ, et al., 2019) is: 17.8%    Values used to calculate the score:      Age: 68 years      Sex: Male      Is Non- : No      Diabetic: No      Tobacco smoker: No      Systolic Blood Pressure: 136 mmHg      Is BP treated: Yes      HDL Cholesterol: 51 mg/dL      Total Cholesterol: 186 mg/dL  Patient tried different statins and did not tolerate.  He has been doing well on Zetia and is levels are improved, although not ideal.  We will continue on Zetia.    3. Essential hypertension  BP Readings from Last 3 Encounters:   05/02/23 136/86   03/24/23 150/90   01/13/23 124/76   Blood pressure still not well controlled.  Recommend increasing the dose of lisinopril hydrochlorothiazide from 20- 12.5mg to 20-25 mg strength.  We will do follow-up in 3-4 months.  - lisinopril-hydrochlorothiazide (PRINZIDE,ZESTORETIC) 20-25 MG per tablet; Take 1 tablet by mouth Daily.  Dispense: 90 tablet; Refill: 1    4. Obesity (BMI 30.0-34.9)      Patient Self-Management and Personalized Health Advice  The patient " has been provided with information about: diet, exercise and weight management and preventive services including:   · Annual Wellness Visit (AWV).    Outpatient Encounter Medications as of 5/2/2023   Medication Sig Dispense Refill   • cholecalciferol (VITAMIN D3) 25 MCG (1000 UT) tablet Take 1 tablet by mouth Daily.     • ezetimibe (ZETIA) 10 MG tablet TAKE 1 TABLET BY MOUTH EVERY DAY 90 tablet 1   • lisinopril-hydrochlorothiazide (PRINZIDE,ZESTORETIC) 20-25 MG per tablet Take 1 tablet by mouth Daily. 90 tablet 1   • multivitamins-minerals (PRESERVISION AREDS 2) capsule capsule Take 1 capsule by mouth 2 (Two) Times a Day.     • predniSONE (DELTASONE) 5 MG tablet TAKE 1 TABLET BY MOUTH IN THE MORNING PLUS EXTRA AS DIRECTED FOR ILLNESS, MAX EXTRA 5 TABS PER MONTH 105 tablet 2   • [DISCONTINUED] lisinopril-hydrochlorothiazide (PRINZIDE,ZESTORETIC) 20-12.5 MG per tablet Take 1 tablet by mouth Daily. 90 tablet 0   • [DISCONTINUED] lisinopril-hydrochlorothiazide (PRINZIDE,ZESTORETIC) 20-25 MG per tablet Take 1 tablet by mouth Daily. 90 tablet 1     No facility-administered encounter medications on file as of 5/2/2023.       Reviewed use of high risk medication in the elderly: yes  Reviewed for potential of harmful drug interactions in the elderly: yes    Follow Up:  Return in about 15 weeks (around 8/15/2023) for Recheck.     Future Appointments       Provider Department Center    5/24/2023 1:30 PM Xenia Bryson MD [facility] ENDOCRINOLOGY [place]    8/23/2023 1:00 PM Anthony Cramer MD [facility] INTERNAL MEDICINE [place]          There are no Patient Instructions on file for this visit.    An After Visit Summary and PPPS with all of these plans were given to the patient.      Anthony Cramer MD

## 2023-05-24 ENCOUNTER — OFFICE VISIT (OUTPATIENT)
Dept: ENDOCRINOLOGY | Facility: CLINIC | Age: 68
End: 2023-05-24
Payer: MEDICARE

## 2023-05-24 VITALS
WEIGHT: 220 LBS | HEART RATE: 67 BPM | BODY MASS INDEX: 32.58 KG/M2 | SYSTOLIC BLOOD PRESSURE: 118 MMHG | DIASTOLIC BLOOD PRESSURE: 74 MMHG | OXYGEN SATURATION: 98 % | HEIGHT: 69 IN

## 2023-05-24 DIAGNOSIS — E78.5 DYSLIPIDEMIA: ICD-10-CM

## 2023-05-24 DIAGNOSIS — E27.49 SECONDARY ADRENAL INSUFFICIENCY: Primary | Chronic | ICD-10-CM

## 2023-05-24 PROBLEM — E23.6 ACTH DEFICIENCY: Status: ACTIVE | Noted: 2023-05-24

## 2023-05-24 PROBLEM — E23.0 ACTH DEFICIENCY: Status: ACTIVE | Noted: 2023-05-24

## 2023-05-24 PROCEDURE — 99213 OFFICE O/P EST LOW 20 MIN: CPT | Performed by: INTERNAL MEDICINE

## 2023-05-24 PROCEDURE — 3074F SYST BP LT 130 MM HG: CPT | Performed by: INTERNAL MEDICINE

## 2023-05-24 PROCEDURE — 3078F DIAST BP <80 MM HG: CPT | Performed by: INTERNAL MEDICINE

## 2023-05-24 NOTE — PROGRESS NOTES
Chief Complaint   Patient presents with   • Secondary adrenal insufficiency     Follow up        HPI:   Yovany Gonzalez is a 68 y.o.male who returns to [facility]ocrine Clinic for f/u evaluation and management of his secondary adrenal insufficiency. Last visit 2022. His history is as follows:    Interim Events:   - Pt stated his Zetia maybe causing a rash when he is exposed to heat. Did not tolerate multiple statins  - Taking prednisone as directed. May take an extra 1/2 tab when doing increased physical activity.     1) secondary adrenal insufficiency:  - diagnosed in . Was living in [place] at the time. Presented to [facility] with symptoms of significant wt loss of 60 lbs, increased fatigue and hypersomnia. He recalls having serum AM and PM cortisol levels and a bone marrow biopsy as part of his evaluation. Not clear if he had any type of stimulation test at that time. MRI of [place] in  and  did not show an abnormality. Was told he had isolated ACTH deficiency.   - Has taken prednisone since time of diagnosis. Has tried BID dosing of hydrocortisone for approximately 1 month but did not feel well on this glucocorticoid.    Current Dose: prednisone 5 mg qAM  (May take an extra 1/2 tab when doing increased physical activity.)    Has not had to use stress dosing recently.  Not wearing a medical alert bracelet or necklace     DEXA ([facility], 2019) No osteoporosis, No osteopenia    2) familial hypercholesteremia:  - pt was prescribed rosuvastatin 20 mg daily by his PCP in . (2020) Tchol 304, HDL 58, ,  prior to starting medication.  - Repeat Lipid panel 2021: Tchol 161, HDL 47, LDL 94,  - on Crestor 20 mg daily. Reported not tolerating the 20 mg dose of crestor. Did not tolerate 10 mg   - Did not tolerate other statins  - Is currently taking Zetia.   - Pt stated his Zetia maybe causing a rash when he is exposed to heat.    FMHx: No known CAD. Pt's mother   "when he was 5 y.o. Paternal grandfather had an MI after age 60.     Review of Systems   Constitutional: Negative.  Negative for fatigue.        Wt stable   HENT: Positive for hearing loss.    Eyes: Negative.    Respiratory: Negative.  Negative for cough.    Cardiovascular: Negative.    Gastrointestinal: Negative.    Endocrine: Negative.  Negative for heat intolerance.   Genitourinary: Negative.    Musculoskeletal: Positive for arthralgias. Negative for myalgias and neck stiffness.   Skin: Negative.    Allergic/Immunologic: Negative.    Neurological: Negative.  Negative for tremors and headaches.   Hematological: Negative.    Psychiatric/Behavioral: Negative.      The following portions of the patient's history were reviewed and updated as appropriate: allergies, current medications, past family history, past medical history, past social history, past surgical history and problem list.    /74   Pulse 67   Ht 175.3 cm (69\")   Wt 99.8 kg (220 lb)   SpO2 98%   BMI 32.49 kg/m²   Physical Exam  Vitals reviewed.   Constitutional:       General: He is not in acute distress.     Appearance: He is well-developed. He is not diaphoretic.   HENT:      Head: Normocephalic.   Eyes:      Conjunctiva/sclera: Conjunctivae normal.      Pupils: Pupils are equal, round, and reactive to light.   Neck:      Thyroid: No thyromegaly.      Trachea: No tracheal deviation.      Comments: No palpable thyroid nodules    Cardiovascular:      Rate and Rhythm: Normal rate and regular rhythm.      Heart sounds: Normal heart sounds. No murmur heard.  Pulmonary:      Effort: Pulmonary effort is normal. No respiratory distress.      Breath sounds: Normal breath sounds.   Abdominal:      General: Bowel sounds are normal.      Palpations: Abdomen is soft. There is no mass.      Tenderness: There is no abdominal tenderness.   Musculoskeletal:      Cervical back: Neck supple.   Lymphadenopathy:      Cervical: No cervical adenopathy.   Skin:     " General: Skin is warm and dry.      Findings: Lesion (eyelid xanthomas) present. No erythema.   Neurological:      Mental Status: He is alert and oriented to person, place, and time.      Cranial Nerves: No cranial nerve deficit.   Psychiatric:         Behavior: Behavior normal.       LABS/IMAGING: outside records reviewed and summarized in HPI  Lab Results   Component Value Date    GLUCOSE 83 04/21/2023    BUN 21 04/21/2023    CREATININE 1.01 04/21/2023    EGFRRESULT 81.5 04/21/2023    EGFR 84.5 01/13/2023    BCR 20.8 04/21/2023    K 4.4 04/21/2023    CO2 31.6 (H) 04/21/2023    CALCIUM 9.8 04/21/2023    PROTENTOTREF 6.1 04/21/2023    ALBUMIN 3.6 04/21/2023    BILITOT 0.3 04/21/2023    AST 17 04/21/2023    ALT 28 04/21/2023     Lab Results   Component Value Date    IKNIHNBC87 477 01/13/2023     Lab Results   Component Value Date    WBC 10.59 01/13/2023    HGB 13.0 01/13/2023    HCT 40.2 01/13/2023    MCV 84.5 01/13/2023     01/13/2023     Lab Results   Component Value Date    TSH 1.630 01/13/2023       ASSESSMENT/PLAN:  1) secondary adrenal insufficiency:   - clinically without symptoms of AI  - Will continue prednisone 5 mg qAM plus extra as needed during illness.  May take an extra 1/2 tab when doing increased physical activity.   - Reviewed how to increase dosing during times of illness.   - Encouraged pt to wear a medical alert bracelet or necklace    2) dyslipidemia:  - h/o statin intolerance  - Asked pt to hold the Zetia for 1 week and then restart to help determine if the medication is actually causing drug rash.  Discussed Nexlatol as a possible option if cost not prohibitive. Sample box given to patient to try only if cannot tolerate the Zetia.     RTC 12 months    Signed: Xenia Bryson MD

## 2023-06-08 DIAGNOSIS — E78.00 HYPERCHOLESTEROLEMIA WITH LDL GREATER THAN 190 MG/DL: ICD-10-CM

## 2023-06-08 RX ORDER — EZETIMIBE 10 MG/1
TABLET ORAL
Qty: 90 TABLET | Refills: 1 | Status: SHIPPED | OUTPATIENT
Start: 2023-06-08

## 2023-06-14 DIAGNOSIS — I10 ESSENTIAL HYPERTENSION: Chronic | ICD-10-CM

## 2023-06-14 RX ORDER — LISINOPRIL AND HYDROCHLOROTHIAZIDE 20; 12.5 MG/1; MG/1
TABLET ORAL
Qty: 90 TABLET | Refills: 0 | OUTPATIENT
Start: 2023-06-14

## 2023-08-23 ENCOUNTER — OFFICE VISIT (OUTPATIENT)
Dept: INTERNAL MEDICINE | Facility: CLINIC | Age: 68
End: 2023-08-23
Payer: MEDICARE

## 2023-08-23 VITALS
SYSTOLIC BLOOD PRESSURE: 134 MMHG | TEMPERATURE: 97.7 F | BODY MASS INDEX: 32.88 KG/M2 | HEIGHT: 69 IN | HEART RATE: 60 BPM | WEIGHT: 222 LBS | DIASTOLIC BLOOD PRESSURE: 84 MMHG

## 2023-08-23 DIAGNOSIS — E78.00 HYPERCHOLESTEROLEMIA WITH LDL GREATER THAN 190 MG/DL: Chronic | ICD-10-CM

## 2023-08-23 DIAGNOSIS — I10 ESSENTIAL HYPERTENSION: Primary | Chronic | ICD-10-CM

## 2023-08-23 PROCEDURE — 1160F RVW MEDS BY RX/DR IN RCRD: CPT | Performed by: STUDENT IN AN ORGANIZED HEALTH CARE EDUCATION/TRAINING PROGRAM

## 2023-08-23 PROCEDURE — 3079F DIAST BP 80-89 MM HG: CPT | Performed by: STUDENT IN AN ORGANIZED HEALTH CARE EDUCATION/TRAINING PROGRAM

## 2023-08-23 PROCEDURE — 3075F SYST BP GE 130 - 139MM HG: CPT | Performed by: STUDENT IN AN ORGANIZED HEALTH CARE EDUCATION/TRAINING PROGRAM

## 2023-08-23 PROCEDURE — 1159F MED LIST DOCD IN RCRD: CPT | Performed by: STUDENT IN AN ORGANIZED HEALTH CARE EDUCATION/TRAINING PROGRAM

## 2023-08-23 PROCEDURE — 99214 OFFICE O/P EST MOD 30 MIN: CPT | Performed by: STUDENT IN AN ORGANIZED HEALTH CARE EDUCATION/TRAINING PROGRAM

## 2023-08-23 RX ORDER — LOSARTAN POTASSIUM AND HYDROCHLOROTHIAZIDE 12.5; 5 MG/1; MG/1
1 TABLET ORAL DAILY
Qty: 30 TABLET | Refills: 0 | Status: SHIPPED | OUTPATIENT
Start: 2023-08-23

## 2023-08-23 NOTE — PROGRESS NOTES
"Chief Complaint  Yovany Gonzalez is a 68 y.o. male presenting for Hypertension.     From [place]. Moved to [place] 2013. Worked as  in the past, retired 2018. . No children.     Patient has a past medical history of ACTH deficiency with secondary adrenal insufficiency, hypertension (2022), hyperlipidemia, colon adenoma, cataracts and presbyopia.    History of Present Illness  Patient is here for follow-up.    He has tried out the Zetia and went off it.  He had significant side effects with diarrhea/loose bowels/frequent bowel movements 3-5 times every morning.  He also had a rash of his lower abdomen.  This went away when he stopped the medication.    He also recently had a fall while going up from the cold to the heat.  He is left face, but this is all healed up.  He has not had any more episodes.    He reports significant cough, at night and this all started after lisinopril with increased dose.    The following portions of the patient's history were reviewed and updated as appropriate: allergies, current medications, past family history, past medical history, past social history, past surgical history, and problem list.    Objective  /84 (BP Location: Left arm, Patient Position: Sitting, Cuff Size: Adult)   Pulse 60   Temp 97.7 øF (36.5 øC) (Temporal)   Ht 175.3 cm (69.02\")   Wt 101 kg (222 lb)   BMI 32.77 kg/mý     Physical Exam  Vitals reviewed.   Constitutional:       Appearance: Normal appearance.   HENT:      Head: Normocephalic and atraumatic.      Nose: No congestion.   Eyes:      Extraocular Movements: Extraocular movements intact.      Conjunctiva/sclera: Conjunctivae normal.   Cardiovascular:      Rate and Rhythm: Normal rate and regular rhythm.      Heart sounds: Normal heart sounds. No murmur heard.  Pulmonary:      Effort: Pulmonary effort is normal.      Breath sounds: Normal breath sounds.   Musculoskeletal:      Cervical back: Neck supple.      Right lower leg: " No edema.      Left lower leg: No edema.   Skin:     General: Skin is warm and dry.   Neurological:      Mental Status: He is alert and oriented to person, place, and time. Mental status is at baseline.   Psychiatric:         Behavior: Behavior normal.         Thought Content: Thought content normal.       Assessment/Plan   1. Essential hypertension  BP Readings from Last 3 Encounters:   08/23/23 134/84   05/24/23 118/74   05/02/23 136/86   Blood pressure not ideal, but unfortunately he likely has cough and side effect of the lisinopril.  We will discontinue lisinopril hydrochlorothiazide 20-25 mg and transition to:  - losartan-hydrochlorothiazide (HYZAAR) 50-12.5 MG per tablet; Take 1 tablet by mouth Daily.  Dispense: 30 tablet; Refill: 0    2. Hypercholesterolemia with LDL greater than 190 mg/dL  He does not tolerate statins or Zetia.  For now we will continue without medications for this per patient preference.    Follow-up in 4 weeks for blood pressure check.  I have asked patient to send me a [employer] message if blood pressures run over 140/90, if so I would consider increasing his medication for blood pressure.      Return in about 4 weeks (around 9/20/2023) for Recheck.    Future Appointments         Provider Department Center    9/25/2023 9:00 AM Anthony Cramer MD [facility] INTERNAL MEDICINE [facility]    5/28/2024 1:00 PM (Arrive by 12:45 PM) Xenia Bryson MD [facility] ENDOCRINOLOGY [facility]              Anthony Cramer MD  Family Medicine  08/23/2023

## 2023-09-07 ENCOUNTER — PATIENT MESSAGE (OUTPATIENT)
Dept: INTERNAL MEDICINE | Facility: CLINIC | Age: 68
End: 2023-09-07
Payer: MEDICARE

## 2023-09-15 DIAGNOSIS — I10 ESSENTIAL HYPERTENSION: Chronic | ICD-10-CM

## 2023-09-15 RX ORDER — LOSARTAN POTASSIUM AND HYDROCHLOROTHIAZIDE 12.5; 5 MG/1; MG/1
TABLET ORAL
Qty: 30 TABLET | Refills: 0 | Status: SHIPPED | OUTPATIENT
Start: 2023-09-15

## 2023-09-25 ENCOUNTER — OFFICE VISIT (OUTPATIENT)
Dept: INTERNAL MEDICINE | Facility: CLINIC | Age: 68
End: 2023-09-25

## 2023-09-25 VITALS
SYSTOLIC BLOOD PRESSURE: 142 MMHG | HEIGHT: 69 IN | WEIGHT: 220 LBS | HEART RATE: 64 BPM | BODY MASS INDEX: 32.58 KG/M2 | DIASTOLIC BLOOD PRESSURE: 90 MMHG | TEMPERATURE: 98.4 F

## 2023-09-25 DIAGNOSIS — I11.9 LVH (LEFT VENTRICULAR HYPERTROPHY) DUE TO HYPERTENSIVE DISEASE, WITHOUT HEART FAILURE: Chronic | ICD-10-CM

## 2023-09-25 DIAGNOSIS — E27.49 SECONDARY ADRENAL INSUFFICIENCY: Chronic | ICD-10-CM

## 2023-09-25 DIAGNOSIS — Z87.891 PERSONAL HISTORY OF TOBACCO USE, PRESENTING HAZARDS TO HEALTH: ICD-10-CM

## 2023-09-25 DIAGNOSIS — I10 ESSENTIAL HYPERTENSION: Primary | Chronic | ICD-10-CM

## 2023-09-25 PROCEDURE — 3077F SYST BP >= 140 MM HG: CPT | Performed by: STUDENT IN AN ORGANIZED HEALTH CARE EDUCATION/TRAINING PROGRAM

## 2023-09-25 PROCEDURE — 3080F DIAST BP >= 90 MM HG: CPT | Performed by: STUDENT IN AN ORGANIZED HEALTH CARE EDUCATION/TRAINING PROGRAM

## 2023-09-25 PROCEDURE — 99214 OFFICE O/P EST MOD 30 MIN: CPT | Performed by: STUDENT IN AN ORGANIZED HEALTH CARE EDUCATION/TRAINING PROGRAM

## 2023-09-25 PROCEDURE — 1160F RVW MEDS BY RX/DR IN RCRD: CPT | Performed by: STUDENT IN AN ORGANIZED HEALTH CARE EDUCATION/TRAINING PROGRAM

## 2023-09-25 PROCEDURE — 1159F MED LIST DOCD IN RCRD: CPT | Performed by: STUDENT IN AN ORGANIZED HEALTH CARE EDUCATION/TRAINING PROGRAM

## 2023-09-25 RX ORDER — LOSARTAN POTASSIUM 50 MG/1
50 TABLET ORAL DAILY
Qty: 90 TABLET | Refills: 0 | Status: SHIPPED | OUTPATIENT
Start: 2023-09-25

## 2023-09-25 NOTE — PROGRESS NOTES
"Chief Complaint  Yovany Gonzalez is a 68 y.o. male presenting for Hypertension.     From [place]. Moved to [place] 2013. Worked as  in the past, retired 2018. . No children.     Patient has a past medical history of ACTH deficiency with secondary adrenal insufficiency, hypertension (2022), hyperlipidemia, colon adenoma, cataracts and presbyopia.    History of Present Illness  Patient is here for follow-up.    He tells me he had a couple of episodes of fever one 1.5 F that lasted for about 2 days, he also had chills, but no respiratory symptoms.  He thinks he might have been the medication, he thinks it was related to hydrochlorothiazide.  He did have fever in the past on Wellbutrin up to 104, the medication was stopped and then when he started it again he got another episode of high fever, and the medication was discontinued.  This was taken for smoking cessation.  Due to the fever concern patient stopped taking his blood pressure medications.  See current blood pressures.  He would be amenable to other blood pressure medication.      The following portions of the patient's history were reviewed and updated as appropriate: allergies, current medications, past family history, past medical history, past social history, past surgical history, and problem list.          Objective  /90 (BP Location: Left arm, Patient Position: Sitting, Cuff Size: Adult)   Pulse 64 Comment: irregular  Temp 98.4 °F (36.9 °C) (Temporal)   Ht 175.3 cm (69.02\")   Wt 99.8 kg (220 lb)   BMI 32.47 kg/m²     Physical Exam  Vitals reviewed.   Constitutional:       Appearance: Normal appearance.   HENT:      Head: Normocephalic and atraumatic.      Nose: No congestion.   Eyes:      Extraocular Movements: Extraocular movements intact.      Conjunctiva/sclera: Conjunctivae normal.   Cardiovascular:      Rate and Rhythm: Normal rate and regular rhythm.      Heart sounds: Normal heart sounds. No murmur " heard.  Pulmonary:      Effort: Pulmonary effort is normal.      Breath sounds: Normal breath sounds.   Musculoskeletal:      Cervical back: Neck supple.      Right lower leg: No edema.      Left lower leg: No edema.   Skin:     General: Skin is warm and dry.   Neurological:      Mental Status: He is alert and oriented to person, place, and time. Mental status is at baseline.   Psychiatric:         Behavior: Behavior normal.         Thought Content: Thought content normal.       Assessment/Plan   1. Essential hypertension  BP Readings from Last 3 Encounters:   09/25/23 142/90   08/23/23 134/84   05/24/23 118/74   Blood pressure is not well controlled.  Fever with medication is unusual, and this pattern does not immediately raise concern that this is related to medication.  However patient has stopped taking, also for simplicity will change to losartan only, discontinue losartan-hydrochlorothiazide 50-12.5 mg.  I have asked him to send me a message in a couple weeks with updated blood pressures, if needed we can increase the losartan to 100 mg.  - losartan (Cozaar) 50 MG tablet; Take 1 tablet by mouth Daily.  Dispense: 90 tablet; Refill: 0    2. LVH (left ventricular hypertrophy) due to hypertensive disease, without heart failure  It is important to maintain blood pressures due to this.    3. Secondary adrenal insufficiency  Cannot rule out fever related to his underlying condition, he has follow-up with endocrinology.    4. Personal history of tobacco use, presenting hazards to health  Patient quit smoking in 2022, 40-pack-year history.  He would like to continue screening for now.  - CT chest low dose wo; Future      Return in about 4 weeks (around 10/23/2023) for Recheck.    Future Appointments         Provider Department Center    10/27/2023 2:00 PM Anthony Cramer MD [facility] INTERNAL MEDICINE [place]    5/28/2024 1:00 PM (Arrive by 12:45 PM) Xenia Bryson MD [facility]  ENDOCRINOLOGY [facility] Cramer MD  Family Medicine  09/25/2023

## 2023-10-11 DIAGNOSIS — I10 ESSENTIAL HYPERTENSION: Chronic | ICD-10-CM

## 2023-10-11 RX ORDER — LOSARTAN POTASSIUM 100 MG/1
100 TABLET ORAL DAILY
Qty: 90 TABLET | Refills: 1 | Status: SHIPPED | OUTPATIENT
Start: 2023-10-11

## 2023-10-17 ENCOUNTER — TELEPHONE (OUTPATIENT)
Dept: INTERNAL MEDICINE | Facility: CLINIC | Age: 68
End: 2023-10-17

## 2023-10-17 NOTE — TELEPHONE ENCOUNTER
Caller: Yovany Gonzalez    Relationship: Self    Best call back number: [phone]     What orders are you requesting (i.e. lab or imaging): BLOOD WORK    In what timeframe would the patient need to come in: AS SOON AS POSSIBLE    Where will you receive your lab/imaging services: IN OFFICE    Additional notes: PATIENT HAD A LETTER STATING HE HAD OUTSTANDING ORDERS, BUT HE DOES NOT SEE THAT LETTER ON [employer] AND THERE ARE NO OPEN ORDERS SHOWING. PLEASE VERIFY IF THERE ARE OPEN ORDERS, OR IF BLOOD WORK IS DESIRED. IF SO, PUT IN ORDERS AND LET PATIENT KNOW THEY ARE AVAILABLE, AS THEY WANT TO DO A WALK IN FOR THOSE ON 10/20/2023. IF THEY AREN'T REQUIRED, PLEASE CALL TO LET PATIENT KNOW THAT AS WELL.

## 2023-10-17 NOTE — TELEPHONE ENCOUNTER
Pt notified and verbalized understanding   Same Histology In Subsequent Stages Text: The pattern and morphology of the tumor is as described in the first stage.

## 2023-10-17 NOTE — TELEPHONE ENCOUNTER
Yes if he can do it fasting, so we can see how his cholesterol levels look now that he is off medications for cholesterol.  Thanks

## 2023-10-23 ENCOUNTER — TELEPHONE (OUTPATIENT)
Dept: INTERNAL MEDICINE | Facility: CLINIC | Age: 68
End: 2023-10-23
Payer: MEDICARE

## 2023-10-23 NOTE — TELEPHONE ENCOUNTER
Patient understood and verbalized understanding. He states his lips aren't numb but it's an anxious feeling in the morning he states, where his lips feel different for a little while. It's his whole lip area, only that area he states.    He said he will monitor his blood pressures and let us know if they are running high still. He has an appt with Dr. Cramer for 10/27/23

## 2023-10-23 NOTE — TELEPHONE ENCOUNTER
That would not be a typical side effect of the COVID-vaccine.  If his blood pressures are running this high he should come back in for a visit.  If the numbness on both sides versus more on one side?  This can be strokelike symptoms and concerning for hypertensive emergency, which actually would be a reason to go to the emergency room.  If he continues to experience symptoms or high blood pressures, especially blood pressure over 160 he should come in same day.

## 2023-10-23 NOTE — TELEPHONE ENCOUNTER
Patient called stated he had the covid booster last Monday 10/16/23 and his blood pressure has been elevated last week it was 145/104 and 160/ 87 this morning it was 166 95 and 174/101 also every morning when he wakes up his lips feel tingly but it goes away after he's up for awhile. He wants to know if this is some side effects from the vaccine?

## 2023-10-24 ENCOUNTER — LAB (OUTPATIENT)
Dept: LAB | Facility: HOSPITAL | Age: 68
End: 2023-10-24
Payer: MEDICARE

## 2023-10-24 DIAGNOSIS — I10 ESSENTIAL HYPERTENSION: Primary | ICD-10-CM

## 2023-10-24 LAB
ALBUMIN SERPL-MCNC: 3.9 G/DL (ref 3.5–5.2)
ALBUMIN/GLOB SERPL: 1.6 G/DL
ALP SERPL-CCNC: 59 U/L (ref 39–117)
ALT SERPL W P-5'-P-CCNC: 14 U/L (ref 1–41)
ANION GAP SERPL CALCULATED.3IONS-SCNC: 6.8 MMOL/L (ref 5–15)
AST SERPL-CCNC: 17 U/L (ref 1–40)
BILIRUB SERPL-MCNC: 0.4 MG/DL (ref 0–1.2)
BUN SERPL-MCNC: 16 MG/DL (ref 8–23)
BUN/CREAT SERPL: 18 (ref 7–25)
CALCIUM SPEC-SCNC: 9.3 MG/DL (ref 8.6–10.5)
CHLORIDE SERPL-SCNC: 103 MMOL/L (ref 98–107)
CHOLEST SERPL-MCNC: 254 MG/DL (ref 0–200)
CO2 SERPL-SCNC: 29.2 MMOL/L (ref 22–29)
CREAT SERPL-MCNC: 0.89 MG/DL (ref 0.76–1.27)
EGFRCR SERPLBLD CKD-EPI 2021: 93.9 ML/MIN/1.73
GLOBULIN UR ELPH-MCNC: 2.4 GM/DL
GLUCOSE SERPL-MCNC: 88 MG/DL (ref 65–99)
HDLC SERPL-MCNC: 49 MG/DL (ref 40–60)
LDLC SERPL CALC-MCNC: 181 MG/DL (ref 0–100)
LDLC/HDLC SERPL: 3.65 {RATIO}
POTASSIUM SERPL-SCNC: 3.8 MMOL/L (ref 3.5–5.2)
PROT SERPL-MCNC: 6.3 G/DL (ref 6–8.5)
SODIUM SERPL-SCNC: 139 MMOL/L (ref 136–145)
TRIGL SERPL-MCNC: 130 MG/DL (ref 0–150)
VLDLC SERPL-MCNC: 24 MG/DL (ref 5–40)

## 2023-10-24 PROCEDURE — 36415 COLL VENOUS BLD VENIPUNCTURE: CPT

## 2023-10-24 PROCEDURE — 80053 COMPREHEN METABOLIC PANEL: CPT

## 2023-10-24 PROCEDURE — 80061 LIPID PANEL: CPT

## 2023-10-25 DIAGNOSIS — E27.49 SECONDARY ADRENAL INSUFFICIENCY: ICD-10-CM

## 2023-10-27 ENCOUNTER — OFFICE VISIT (OUTPATIENT)
Dept: INTERNAL MEDICINE | Facility: CLINIC | Age: 68
End: 2023-10-27
Payer: MEDICARE

## 2023-10-27 VITALS
DIASTOLIC BLOOD PRESSURE: 86 MMHG | HEART RATE: 62 BPM | HEIGHT: 69 IN | TEMPERATURE: 97.8 F | OXYGEN SATURATION: 97 % | BODY MASS INDEX: 32.61 KG/M2 | WEIGHT: 220.2 LBS | SYSTOLIC BLOOD PRESSURE: 158 MMHG

## 2023-10-27 DIAGNOSIS — I10 ESSENTIAL HYPERTENSION: Primary | Chronic | ICD-10-CM

## 2023-10-27 DIAGNOSIS — I11.9 LVH (LEFT VENTRICULAR HYPERTROPHY) DUE TO HYPERTENSIVE DISEASE, WITHOUT HEART FAILURE: Chronic | ICD-10-CM

## 2023-10-27 PROCEDURE — 1160F RVW MEDS BY RX/DR IN RCRD: CPT | Performed by: STUDENT IN AN ORGANIZED HEALTH CARE EDUCATION/TRAINING PROGRAM

## 2023-10-27 PROCEDURE — 3077F SYST BP >= 140 MM HG: CPT | Performed by: STUDENT IN AN ORGANIZED HEALTH CARE EDUCATION/TRAINING PROGRAM

## 2023-10-27 PROCEDURE — 1159F MED LIST DOCD IN RCRD: CPT | Performed by: STUDENT IN AN ORGANIZED HEALTH CARE EDUCATION/TRAINING PROGRAM

## 2023-10-27 PROCEDURE — 99214 OFFICE O/P EST MOD 30 MIN: CPT | Performed by: STUDENT IN AN ORGANIZED HEALTH CARE EDUCATION/TRAINING PROGRAM

## 2023-10-27 PROCEDURE — 3079F DIAST BP 80-89 MM HG: CPT | Performed by: STUDENT IN AN ORGANIZED HEALTH CARE EDUCATION/TRAINING PROGRAM

## 2023-10-27 RX ORDER — PREDNISONE 5 MG/1
TABLET ORAL
Qty: 105 TABLET | Refills: 1 | Status: SHIPPED | OUTPATIENT
Start: 2023-10-27

## 2023-10-27 RX ORDER — CARVEDILOL 3.12 MG/1
3.12 TABLET ORAL 2 TIMES DAILY WITH MEALS
Qty: 60 TABLET | Refills: 1 | Status: SHIPPED | OUTPATIENT
Start: 2023-10-27

## 2023-10-27 NOTE — PROGRESS NOTES
"Chief Complaint  Yovany Gonzalez is a 68 y.o. male presenting for Hypertension.     From [place]. Moved to [place] 2013. Worked as  in the past, retired 2018. . No children.     Patient has a past medical history of ACTH deficiency with secondary adrenal insufficiency, hypertension (2022), hyperlipidemia, colon adenoma, cataracts and presbyopia.    History of Present Illness  Patient is here for follow-up of his blood pressure.    Over the last couple of weeks his blood pressures have ranged around 130-166/. Yesterday 146/87.  This morning 132/88.    The following portions of the patient's history were reviewed and updated as appropriate: allergies, current medications, past family history, past medical history, past social history, past surgical history, and problem list.    Objective  /86 (BP Location: Right arm, Patient Position: Sitting, Cuff Size: Large Adult)   Pulse 62   Temp 97.8 °F (36.6 °C) (Infrared)   Ht 175.3 cm (69.02\")   Wt 99.9 kg (220 lb 3.2 oz)   SpO2 97%   BMI 32.50 kg/m²     Physical Exam  Constitutional:       Appearance: Normal appearance.   HENT:      Head: Normocephalic and atraumatic.   Eyes:      Extraocular Movements: Extraocular movements intact.      Conjunctiva/sclera: Conjunctivae normal.   Pulmonary:      Effort: Pulmonary effort is normal. No respiratory distress.   Musculoskeletal:      Cervical back: Neck supple.   Skin:     General: Skin is warm and dry.   Neurological:      Mental Status: He is alert and oriented to person, place, and time. Mental status is at baseline.   Psychiatric:         Behavior: Behavior normal.         Thought Content: Thought content normal.         Assessment/Plan   1. Essential hypertension  2. LVH (left ventricular hypertrophy) due to hypertensive disease, without heart failure  BP Readings from Last 3 Encounters:   10/27/23 158/86   09/25/23 142/90   08/23/23 134/84   His blood pressure is still not well " controlled.  He does not terribly high, but given his LVH we need to get it further down.  We have tried calcium channel blocker with nifedipine, we will try hydrochlorothiazide, lisinopril, he is now up to losartan 100 mg.  I recommend trial of beta-blocker, my suggestion would be carvedilol, which is dosed every 12 hours and also beneficial for the heart.  It can lowers his heart rate, it can cause erectile dysfunction, can also cause cold hands and feet.  If blood pressure drops too much he has let me know, otherwise he can send in updated blood pressures after couple weeks to see if it levels off, potentially we can increase the dose.  In any case he will follow-up with me in about 6 weeks  - carvedilol (COREG) 3.125 MG tablet; Take 1 tablet by mouth 2 (Two) Times a Day With Meals.  Dispense: 60 tablet; Refill: 1      Return in about 6 weeks (around 12/8/2023) for Recheck.    Future Appointments         Provider Department Center    12/5/2023 10:30 AM [facility] 1 [facility] AT [facility] (    5/28/2024 1:00 PM (Arrive by 12:45 PM) Xenia Bryson MD [facility] ENDOCRINOLOGY [place] Cramer MD  Family Medicine  10/27/2023

## 2023-10-27 NOTE — TELEPHONE ENCOUNTER
Rx Refill Note  Requested Prescriptions     Pending Prescriptions Disp Refills    predniSONE (DELTASONE) 5 MG tablet [Pharmacy Med Name: PREDNISONE 5 MG TABLET] 105 tablet 2     Sig: TAKE 1 TABLET BY MOUTH IN THE MORNING PLUS EXTRA AS DIRECTED FOR ILLNESS, MAX EXTRA 5 TABS PER MONTH        Last office visit with prescribing clinician: 5/24/23     Next office visit with prescribing clinician: 5/28/24      Sandee Bright (Jodi)  10/27/23, 10:31 EDT

## 2023-11-29 DIAGNOSIS — I10 ESSENTIAL HYPERTENSION: Chronic | ICD-10-CM

## 2023-11-29 RX ORDER — CARVEDILOL 3.12 MG/1
3.12 TABLET ORAL 2 TIMES DAILY WITH MEALS
Qty: 180 TABLET | Refills: 1 | Status: SHIPPED | OUTPATIENT
Start: 2023-11-29

## 2023-12-05 ENCOUNTER — HOSPITAL ENCOUNTER (OUTPATIENT)
Dept: CT IMAGING | Facility: HOSPITAL | Age: 68
Discharge: HOME | End: 2023-12-05
Admitting: STUDENT IN AN ORGANIZED HEALTH CARE EDUCATION/TRAINING PROGRAM
Payer: MEDICARE

## 2023-12-05 DIAGNOSIS — Z87.891 PERSONAL HISTORY OF TOBACCO USE, PRESENTING HAZARDS TO HEALTH: ICD-10-CM

## 2023-12-05 PROCEDURE — 71271 CT THORAX LUNG CANCER SCR C-: CPT

## 2023-12-07 ENCOUNTER — PATIENT MESSAGE (OUTPATIENT)
Dept: INTERNAL MEDICINE | Facility: CLINIC | Age: 68
End: 2023-12-07
Payer: MEDICARE

## 2023-12-07 DIAGNOSIS — I10 ESSENTIAL HYPERTENSION: Primary | Chronic | ICD-10-CM

## 2023-12-07 NOTE — TELEPHONE ENCOUNTER
Paige Combs RN 12/7/2023 11:35 AM EST      ----- Message -----  From: Yovany Gonzalez  Sent: 12/7/2023 11:32 AM EST  To: Margaux Arriola [address] Clinical Pool  Subject: BP Readings/Carvedilol     Dr. Cramer,  I sent in my recent BP readings which didn't seem too good. I was going to see about maybe bumping up the Carvedilol a little but not sure on that. Ever since I've been taking the Carvedilol I seem to have issues with shortness of breath. I did not have this before so I'm concerned about a higher dosage and the fact it's not working as good as the last type of medication. I don't know why I have all this medication reaction but very frustrating. I've got about a week of current meds left . Thanks for your time, Eliot

## 2023-12-08 RX ORDER — HYDROCHLOROTHIAZIDE 12.5 MG/1
12.5 CAPSULE, GELATIN COATED ORAL DAILY
Qty: 30 CAPSULE | Refills: 1 | Status: SHIPPED | OUTPATIENT
Start: 2023-12-08

## 2023-12-28 DIAGNOSIS — I10 ESSENTIAL HYPERTENSION: Primary | Chronic | ICD-10-CM

## 2024-01-02 RX ORDER — LOSARTAN POTASSIUM AND HYDROCHLOROTHIAZIDE 12.5; 1 MG/1; MG/1
1 TABLET ORAL DAILY
Qty: 90 TABLET | Refills: 1 | Status: SHIPPED | OUTPATIENT
Start: 2024-01-02

## 2024-02-06 ENCOUNTER — TELEPHONE (OUTPATIENT)
Dept: ENDOCRINOLOGY | Facility: CLINIC | Age: 69
End: 2024-02-06
Payer: MEDICARE

## 2024-02-06 DIAGNOSIS — I10 ESSENTIAL HYPERTENSION: Primary | ICD-10-CM

## 2024-02-06 NOTE — TELEPHONE ENCOUNTER
Spoke to patient about lab results. Although aldosterone level was not elevated, his renin level was at the lower end of the range despite taking losartan 100 mg daily. I will have pt repeat his rita/renin levels at our lab. May consider adding spironolactone if renin again low.   Electronically Signed: Xenia Bryson MD

## 2024-02-23 ENCOUNTER — LAB (OUTPATIENT)
Dept: ENDOCRINOLOGY | Facility: CLINIC | Age: 69
End: 2024-02-23
Payer: MEDICARE

## 2024-02-23 DIAGNOSIS — I10 ESSENTIAL HYPERTENSION: ICD-10-CM

## 2024-02-23 PROCEDURE — 36415 COLL VENOUS BLD VENIPUNCTURE: CPT | Performed by: INTERNAL MEDICINE

## 2024-02-23 PROCEDURE — 82088 ASSAY OF ALDOSTERONE: CPT | Performed by: INTERNAL MEDICINE

## 2024-02-23 PROCEDURE — 84244 ASSAY OF RENIN: CPT | Performed by: INTERNAL MEDICINE

## 2024-02-29 LAB — ALDOST SERPL-MCNC: 8.3 NG/DL (ref 0–30)

## 2024-03-01 LAB — RENIN PLAS-CCNC: 0.28 NG/ML/HR (ref 0.17–5.38)

## 2024-03-06 ENCOUNTER — TELEPHONE (OUTPATIENT)
Dept: ENDOCRINOLOGY | Facility: CLINIC | Age: 69
End: 2024-03-06
Payer: MEDICARE

## 2024-03-06 DIAGNOSIS — I10 ESSENTIAL HYPERTENSION: Primary | ICD-10-CM

## 2024-03-06 RX ORDER — SPIRONOLACTONE 25 MG/1
25 TABLET ORAL DAILY
Qty: 30 TABLET | Refills: 5 | Status: SHIPPED | OUTPATIENT
Start: 2024-03-06

## 2024-03-06 NOTE — TELEPHONE ENCOUNTER
Patient called stated he still has not heard from Dr. Bryson about his recent lab work. He stated he is wanting to know what he needs to do about his blood pressure medication? Please advise.

## 2024-03-06 NOTE — TELEPHONE ENCOUNTER
Spoke with patient about recent lab results from 02/23/2024 collected off of all HTN medications since early February 2024. Based on findings will have pt try low dose spironolactone 25 mg daily. Reviewed potential side effects. Pt has f/u scheduled for 05/28/2024  Electronically Signed: Xenia Bryson MD

## 2024-04-25 DIAGNOSIS — E27.49 SECONDARY ADRENAL INSUFFICIENCY: ICD-10-CM

## 2024-04-25 RX ORDER — PREDNISONE 5 MG/1
TABLET ORAL
Qty: 105 TABLET | Refills: 1 | Status: SHIPPED | OUTPATIENT
Start: 2024-04-25

## 2024-04-25 NOTE — TELEPHONE ENCOUNTER
Rx Refill Note  Requested Prescriptions     Pending Prescriptions Disp Refills    predniSONE (DELTASONE) 5 MG tablet [Pharmacy Med Name: PREDNISONE 5 MG TABLET] 105 tablet 1     Sig: TAKE 1 TABLET BY MOUTH IN THE MORNING PLUS EXTRA AS DIRECTED FOR ILLNESS, MAX EXTRA 5 TABS PER MONTH      Last office visit with prescribing clinician: 05/24/2023     Next office visit with prescribing clinician: 05/28/2024                 Bobby Fernandes MA  04/25/24, 10:10 EDT

## 2024-05-28 ENCOUNTER — OFFICE VISIT (OUTPATIENT)
Dept: ENDOCRINOLOGY | Facility: CLINIC | Age: 69
End: 2024-05-28
Payer: MEDICARE

## 2024-05-28 VITALS
DIASTOLIC BLOOD PRESSURE: 82 MMHG | OXYGEN SATURATION: 97 % | BODY MASS INDEX: 33.92 KG/M2 | SYSTOLIC BLOOD PRESSURE: 136 MMHG | HEART RATE: 60 BPM | WEIGHT: 229 LBS | HEIGHT: 69 IN

## 2024-05-28 DIAGNOSIS — I10 ESSENTIAL HYPERTENSION: ICD-10-CM

## 2024-05-28 DIAGNOSIS — E78.00 HYPERCHOLESTEROLEMIA WITH LDL GREATER THAN 190 MG/DL: Chronic | ICD-10-CM

## 2024-05-28 DIAGNOSIS — E27.49 SECONDARY ADRENAL INSUFFICIENCY: Primary | ICD-10-CM

## 2024-05-28 LAB
ALBUMIN SERPL-MCNC: 4 G/DL (ref 3.5–5.2)
ALBUMIN/GLOB SERPL: 1.4 G/DL
ALP SERPL-CCNC: 67 U/L (ref 39–117)
ALT SERPL W P-5'-P-CCNC: 21 U/L (ref 1–41)
ANION GAP SERPL CALCULATED.3IONS-SCNC: 9.2 MMOL/L (ref 5–15)
AST SERPL-CCNC: 16 U/L (ref 1–40)
BILIRUB SERPL-MCNC: 0.2 MG/DL (ref 0–1.2)
BUN SERPL-MCNC: 19 MG/DL (ref 8–23)
BUN/CREAT SERPL: 17.8 (ref 7–25)
CALCIUM SPEC-SCNC: 9.3 MG/DL (ref 8.6–10.5)
CHLORIDE SERPL-SCNC: 104 MMOL/L (ref 98–107)
CHOLEST SERPL-MCNC: 254 MG/DL (ref 0–200)
CO2 SERPL-SCNC: 26.8 MMOL/L (ref 22–29)
CREAT SERPL-MCNC: 1.07 MG/DL (ref 0.76–1.27)
EGFRCR SERPLBLD CKD-EPI 2021: 75.6 ML/MIN/1.73
GLOBULIN UR ELPH-MCNC: 2.8 GM/DL
GLUCOSE SERPL-MCNC: 92 MG/DL (ref 65–99)
HDLC SERPL-MCNC: 49 MG/DL (ref 40–60)
LDLC SERPL CALC-MCNC: 186 MG/DL (ref 0–100)
LDLC/HDLC SERPL: 3.75 {RATIO}
POTASSIUM SERPL-SCNC: 4.6 MMOL/L (ref 3.5–5.2)
PROT SERPL-MCNC: 6.8 G/DL (ref 6–8.5)
SODIUM SERPL-SCNC: 140 MMOL/L (ref 136–145)
TRIGL SERPL-MCNC: 107 MG/DL (ref 0–150)
VLDLC SERPL-MCNC: 19 MG/DL (ref 5–40)

## 2024-05-28 PROCEDURE — 3079F DIAST BP 80-89 MM HG: CPT | Performed by: INTERNAL MEDICINE

## 2024-05-28 PROCEDURE — 3075F SYST BP GE 130 - 139MM HG: CPT | Performed by: INTERNAL MEDICINE

## 2024-05-28 PROCEDURE — 80053 COMPREHEN METABOLIC PANEL: CPT | Performed by: INTERNAL MEDICINE

## 2024-05-28 PROCEDURE — 99214 OFFICE O/P EST MOD 30 MIN: CPT | Performed by: INTERNAL MEDICINE

## 2024-05-28 PROCEDURE — 36415 COLL VENOUS BLD VENIPUNCTURE: CPT | Performed by: INTERNAL MEDICINE

## 2024-05-28 PROCEDURE — 80061 LIPID PANEL: CPT | Performed by: INTERNAL MEDICINE

## 2024-05-28 NOTE — PROGRESS NOTES
Chief Complaint   Patient presents with    Secondary adrenal insufficiency        HPI:   Yovany Gonzalez is a 69 y.o.male who returns to Endocrine Clinic for f/u evaluation and management of his secondary adrenal insufficiency. Last visit 05/24/2023. His history is as follows:    Interim Events:   - Had multiple rashes (heat sensitivity) on multiple HTN medications. I had pt complete multiple aldosterone/Renin labs to evaluate for hyperaldosteronism, see below.   - Taking prednisone as directed. May take an extra 1/2 tab when doing increased physical activity.     1) secondary adrenal insufficiency:  - diagnosed in 1994. Was living in [place] at the time. Presented to [facility] with symptoms of significant wt loss of 60 lbs, increased fatigue and hypersomnia. He recalls having serum AM and PM cortisol levels and a bone marrow biopsy as part of his evaluation. Not clear if he had any type of stimulation test at that time. MRI of [place] in 1994 and 1996 did not show an abnormality. Was told he had isolated ACTH deficiency.   - Has taken prednisone since time of diagnosis. Has tried BID dosing of hydrocortisone for approximately 1 month but did not feel well on this glucocorticoid.    Current Dose: prednisone 5 mg qAM  (May take an extra 1/2 tab when doing increased physical activity.)    Has not had to use stress dosing recently.  Not wearing a medical alert bracelet or necklace     DEXA ([facility], 09/13/2019) No osteoporosis, No osteopenia    2) familial hypercholesteremia:  - pt was prescribed rosuvastatin 20 mg daily by his PCP in 01/20201. (12/2020) Tchol 304, HDL 58, ,  prior to starting medication.  - Repeat Lipid panel 08/2021: Tchol 161, HDL 47, LDL 94,  - on Crestor 20 mg daily. Reported not tolerating the 20 mg dose of crestor. Did not tolerate 10 mg   - Did not tolerate other statins  - Pt stated his Zetia caused a rash when he is exposed to heat.  - pt did not try nexletol  "samples given to him at a prior visit    3) essential HTN:  - Had multiple rashes (heat sensitivity) on multiple HTN medications. I had pt complete multiple aldosterone/Renin labs to evaluate for hyperaldosteronism:  (2024, 9AM) aldosterone 12.7, renin 0.542 (0.167 - 5.380), A/R ratio 23.4 - on an ARB  (2024, 9AM) aldosterone 8.3, renin 0.275 (0.167 - 5.380), A/R ratio 30.1 - on no HTN medications    Although the patient pt did not have an elevated aldosterone level, his renin was low and his ratio was elevated. I discussed a trial of spironolactone with patient and he was agreeable to a trial of this medication.     Current Rx: spironolactone 25 mg qAM  - denies breast tenderness or gynecomastia  - BP log has shown systolic ranging 120's - 140's, diastolic in the 80's, pulse 60's - 70's  - pt reporting heat sensitivity on this medication when taken in the AM    FMHx: No known CAD. Pt's mother  when he was 5 y.o. Paternal grandfather had an MI after age 60.     Review of Systems   Constitutional: Negative.  Negative for fatigue.        Wt stable   HENT:  Positive for hearing loss.    Eyes: Negative.    Respiratory: Negative.  Negative for cough.    Cardiovascular: Negative.    Gastrointestinal: Negative.    Endocrine: Positive for heat intolerance.   Genitourinary: Negative.    Musculoskeletal:  Positive for arthralgias. Negative for myalgias and neck stiffness.   Skin: Negative.    Allergic/Immunologic: Negative.    Neurological: Negative.  Negative for tremors and headaches.   Hematological: Negative.    Psychiatric/Behavioral: Negative.       The following portions of the patient's history were reviewed and updated as appropriate: allergies, current medications, past family history, past medical history, past social history, past surgical history and problem list.    /82   Pulse 60   Ht 175.3 cm (69.02\")   Wt 104 kg (229 lb)   SpO2 97%   BMI 33.80 kg/m²   Physical Exam  Vitals reviewed. "   Constitutional:       General: He is not in acute distress.     Appearance: He is well-developed. He is not diaphoretic.   HENT:      Head: Normocephalic.   Eyes:      Conjunctiva/sclera: Conjunctivae normal.      Pupils: Pupils are equal, round, and reactive to light.   Neck:      Thyroid: No thyromegaly.      Trachea: No tracheal deviation.      Comments: No palpable thyroid nodules    Cardiovascular:      Rate and Rhythm: Normal rate and regular rhythm.      Heart sounds: Normal heart sounds. No murmur heard.  Pulmonary:      Effort: Pulmonary effort is normal. No respiratory distress.      Breath sounds: Normal breath sounds.   Abdominal:      General: Bowel sounds are normal.      Palpations: Abdomen is soft. There is no mass.      Tenderness: There is no abdominal tenderness.   Musculoskeletal:      Cervical back: Neck supple.   Lymphadenopathy:      Cervical: No cervical adenopathy.   Skin:     General: Skin is warm and dry.      Findings: Lesion (eyelid xanthomas) present. No erythema.   Neurological:      Mental Status: He is alert and oriented to person, place, and time.      Cranial Nerves: No cranial nerve deficit.   Psychiatric:         Behavior: Behavior normal.       LABS/IMAGING: outside records reviewed and summarized in HPI  Office Visit on 05/28/2024   Component Date Value Ref Range Status    Glucose 05/28/2024 92  65 - 99 mg/dL Final    BUN 05/28/2024 19  8 - 23 mg/dL Final    Creatinine 05/28/2024 1.07  0.76 - 1.27 mg/dL Final    Sodium 05/28/2024 140  136 - 145 mmol/L Final    Potassium 05/28/2024 4.6  3.5 - 5.2 mmol/L Final    Chloride 05/28/2024 104  98 - 107 mmol/L Final    CO2 05/28/2024 26.8  22.0 - 29.0 mmol/L Final    Calcium 05/28/2024 9.3  8.6 - 10.5 mg/dL Final    Total Protein 05/28/2024 6.8  6.0 - 8.5 g/dL Final    Albumin 05/28/2024 4.0  3.5 - 5.2 g/dL Final    ALT (SGPT) 05/28/2024 21  1 - 41 U/L Final    AST (SGOT) 05/28/2024 16  1 - 40 U/L Final    Alkaline Phosphatase  05/28/2024 67  39 - 117 U/L Final    Total Bilirubin 05/28/2024 0.2  0.0 - 1.2 mg/dL Final    Globulin 05/28/2024 2.8  gm/dL Final    A/G Ratio 05/28/2024 1.4  g/dL Final    BUN/Creatinine Ratio 05/28/2024 17.8  7.0 - 25.0 Final    Anion Gap 05/28/2024 9.2  5.0 - 15.0 mmol/L Final    eGFR 05/28/2024 75.6  >60.0 mL/min/1.73 Final    Total Cholesterol 05/28/2024 254 (H)  0 - 200 mg/dL Final    Triglycerides 05/28/2024 107  0 - 150 mg/dL Final    HDL Cholesterol 05/28/2024 49  40 - 60 mg/dL Final    LDL Cholesterol  05/28/2024 186 (H)  0 - 100 mg/dL Final    VLDL Cholesterol 05/28/2024 19  5 - 40 mg/dL Final    LDL/HDL Ratio 05/28/2024 3.75   Final       ASSESSMENT/PLAN:  1) secondary adrenal insufficiency:   - clinically without symptoms of AI  - Will continue prednisone 5 mg qAM plus extra as needed during illness.  May take an extra 1/2 tab when doing increased physical activity.   - Reviewed how to increase dosing during times of illness.   - Encouraged pt to wear a medical alert bracelet or necklace    2) essential HTN:  - Had multiple rashes (heat sensitivity) on multiple HTN medications. I had pt complete multiple aldosterone/Renin labs to evaluate for hyperaldosteronism:  (01/29/2024, 9AM) aldosterone 12.7, renin 0.542 (0.167 - 5.380), A/R ratio 23.4 - on an ARB  (02/23/2024, 9AM) aldosterone 8.3, renin 0.275 (0.167 - 5.380), A/R ratio 30.1 - on no HTN medications  - Although the patient pt did not have an elevated aldosterone level, his renin was low and his A/R ratio was elevated.   - He is somewhat tolerating spironolactone 25 mg daily. Pt is again reporting heat sensitivity on this medication when taken in the AM. Will have pt move timing to the PM    3) familial hypercholesteremia:  - pt was prescribed rosuvastatin 20 mg daily by his PCP in 01/20201. (12/2020) Tchol 304, HDL 58, ,  prior to starting medication.  - Repeat Lipid panel 08/2021: Tchol 161, HDL 47, LDL 94,  - on Crestor 20 mg  daily. Reported not tolerating the 20 mg dose of crestor. Did not tolerate 10 mg   - Did not tolerate other statins  - Pt stated his Zetia caused a rash when he is exposed to heat.   - Lipid panel checked today. Will discuss options for treatment (Nexletol or possibly PSK-9 inhibitor if cost not prohibitive)    RTC 6 months    Electronically Signed: Xenia Bryson MD

## 2024-06-06 ENCOUNTER — OFFICE VISIT (OUTPATIENT)
Dept: INTERNAL MEDICINE | Facility: CLINIC | Age: 69
End: 2024-06-06
Payer: MEDICARE

## 2024-06-06 VITALS
TEMPERATURE: 98.2 F | DIASTOLIC BLOOD PRESSURE: 94 MMHG | BODY MASS INDEX: 34.07 KG/M2 | HEIGHT: 69 IN | HEART RATE: 64 BPM | SYSTOLIC BLOOD PRESSURE: 140 MMHG | WEIGHT: 230 LBS | OXYGEN SATURATION: 99 %

## 2024-06-06 DIAGNOSIS — J30.1 SEASONAL ALLERGIC RHINITIS DUE TO POLLEN: ICD-10-CM

## 2024-06-06 DIAGNOSIS — H01.02B SQUAMOUS BLEPHARITIS OF UPPER AND LOWER EYELIDS OF BOTH EYES: Chronic | ICD-10-CM

## 2024-06-06 DIAGNOSIS — H01.02A SQUAMOUS BLEPHARITIS OF UPPER AND LOWER EYELIDS OF BOTH EYES: Chronic | ICD-10-CM

## 2024-06-06 DIAGNOSIS — J06.9 UPPER RESPIRATORY TRACT INFECTION, UNSPECIFIED TYPE: Primary | ICD-10-CM

## 2024-06-06 DIAGNOSIS — I10 ESSENTIAL HYPERTENSION: Chronic | ICD-10-CM

## 2024-06-06 DIAGNOSIS — R09.81 NASAL CONGESTION: ICD-10-CM

## 2024-06-06 DIAGNOSIS — R51.9 HEADACHE AROUND THE EYES: ICD-10-CM

## 2024-06-06 LAB
EXPIRATION DATE: NORMAL
FLUAV AG UPPER RESP QL IA.RAPID: NOT DETECTED
FLUBV AG UPPER RESP QL IA.RAPID: NOT DETECTED
INTERNAL CONTROL: NORMAL
Lab: NORMAL
SARS-COV-2 AG UPPER RESP QL IA.RAPID: NOT DETECTED

## 2024-06-06 PROCEDURE — 1160F RVW MEDS BY RX/DR IN RCRD: CPT | Performed by: INTERNAL MEDICINE

## 2024-06-06 PROCEDURE — 3077F SYST BP >= 140 MM HG: CPT | Performed by: INTERNAL MEDICINE

## 2024-06-06 PROCEDURE — 1126F AMNT PAIN NOTED NONE PRSNT: CPT | Performed by: INTERNAL MEDICINE

## 2024-06-06 PROCEDURE — 87428 SARSCOV & INF VIR A&B AG IA: CPT | Performed by: INTERNAL MEDICINE

## 2024-06-06 PROCEDURE — 99214 OFFICE O/P EST MOD 30 MIN: CPT | Performed by: INTERNAL MEDICINE

## 2024-06-06 PROCEDURE — 3080F DIAST BP >= 90 MM HG: CPT | Performed by: INTERNAL MEDICINE

## 2024-06-06 PROCEDURE — 1159F MED LIST DOCD IN RCRD: CPT | Performed by: INTERNAL MEDICINE

## 2024-06-06 RX ORDER — AZITHROMYCIN 250 MG/1
TABLET, FILM COATED ORAL
Qty: 6 TABLET | Refills: 0 | Status: SHIPPED | OUTPATIENT
Start: 2024-06-06

## 2024-06-06 RX ORDER — FEXOFENADINE HCL 60 MG/1
60 TABLET, FILM COATED ORAL 2 TIMES DAILY
Qty: 60 TABLET | Refills: 0 | Status: SHIPPED | OUTPATIENT
Start: 2024-06-06

## 2024-06-06 RX ORDER — FLUTICASONE PROPIONATE 50 MCG
1 SPRAY, SUSPENSION (ML) NASAL 2 TIMES DAILY
Qty: 16 G | Refills: 11 | Status: SHIPPED | OUTPATIENT
Start: 2024-06-06

## 2024-06-06 NOTE — PATIENT INSTRUCTIONS
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 You can tell that you are exercising at a moderate intensity if your heart starts beating faster and you start breathing faster but can still hold a conversation. You can tell that you are exercising at a vigorous intensity if you are breathing much harder and faster and cannot hold a conversation while exercising.  How can exercise benefit me?  Exercising regularly is important. It has many health benefits, such as:  Improving overall fitness, flexibility, and endurance.  Increasing bone density.  Helping with weight control.  Decreasing body fat.  Increasing muscle strength and endurance.  Reducing stress and tension, anxiety, depression, or anger.  Improving overall health.  What guidelines should I follow while exercising?  Before you start a new exercise program, talk with your health care provider.  Do not exercise so much that you hurt yourself, feel dizzy, or get very short of breath.  Wear comfortable clothes and wear shoes with good support.  Drink plenty of water while you exercise to prevent dehydration or heat stroke.  Work out until your breathing and your heartbeat get faster  (moderate intensity).  How often should I exercise?  Choose an activity that you enjoy, and set realistic goals. Your health care provider can help you make an activity plan that is individually designed and works best for you.  Exercise regularly as told by your health care provider. This may include:  Doing strength training two times a week, such as:  Lifting weights.  Using resistance bands.  Push-ups.  Sit-ups.  Yoga.  Doing a certain intensity of exercise for a given amount of time. Choose from these options:  A total of 150 minutes of moderate-intensity exercise every week.  A total of 75 minutes of vigorous-intensity exercise every week.  A mix of moderate-intensity and vigorous-intensity exercise every week.  Children, pregnant women, people who have not exercised regularly, people who are overweight, and older adults may need to talk with a health care provider about what activities are safe to perform. If you have a medical condition, be sure to talk with your health care provider before you start a new exercise program.  What are some exercise ideas?  Moderate-intensity exercise ideas include:  Walking 1 mile (1.6 km) in about 15 minutes.  Biking.  Hiking.  Golfing.  Dancing.  Water aerobics.  Vigorous-intensity exercise ideas include:  Walking 4.5 miles (7.2 km) or more in about 1 hour.  Jogging or running 5 miles (8 km) in about 1 hour.  Biking 10 miles (16.1 km) or more in about 1 hour.  Lap swimming.  Roller-skating or in-line skating.  Cross-country skiing.  Vigorous competitive sports, such as football, basketball, and soccer.  Jumping rope.  Aerobic dancing.  What are some everyday activities that can help me get exercise?  Yard work, such as:  Pushing a .  Raking and bagging leaves.  Washing your car.  Pushing a stroller.  Shoveling snow.  Gardening.  Washing windows or floors.  How can I be more active in my day-to-day activities?  Use stairs instead of an elevator.  Take a walk during  your lunch break.  If you drive, park your car farther away from your work or school.  If you take public transportation, get off one stop early and walk the rest of the way.  Stand up or walk around during all of your indoor phone calls.  Get up, stretch, and walk around every 30 minutes throughout the day.  Enjoy exercise with a friend. Support to continue exercising will help you keep a regular routine of activity.  Where to find more information  You can find more information about exercising to stay healthy from:  [place] Department of Health and Human Services: www.hhs.gov  Centers for Disease Control and Prevention (CDC): www.cdc.gov  Summary  Exercising regularly is important. It will improve your overall fitness, flexibility, and endurance.  Regular exercise will also improve your overall health. It can help you control your weight, reduce stress, and improve your bone density.  Do not exercise so much that you hurt yourself, feel dizzy, or get very short of breath.  Before you start a new exercise program, talk with your health care provider.  This information is not intended to replace advice given to you by your health care provider. Make sure you discuss any questions you have with your health care provider.  Document Revised: 04/15/2022 Document Reviewed: 04/15/2022  Elsevier Patient Education © 2023 [employer]

## 2024-06-06 NOTE — Clinical Note
Please call patient.  I did advise him before he left that he needed to make an appointment to follow-up in about 1 month to recheck BP with dr. Cramer

## 2024-06-06 NOTE — PROGRESS NOTES
[facility]     Yovany ESTRELLA [facility]  1955   [id number]      Patient Care Team:  Anthony Cramer MD as PCP - General (Family Medicine)  Elmo Sanchez MD as Consulting Physician (General Surgery)  Xenai Bryson MD as Consulting Physician (Endocrinology)    Chief Complaint   Patient presents with    Nasal Congestion     Since 5/23. Runny. Right side, inside of nostril became red.    Did at home Covid test 3 days ago, which was negative.    Eye Drainage    Blepharitis     Some swelling in eyes and redness    Headache     Mild        Patient is a 69 y.o. male.   History of Present Illness  The patient is here for an acute visit.    The patient has been unwell for several weeks, initially presenting with an itchy nose, which he attributed to allergies. Subsequently, he developed a cold accompanied by a runny nose. His condition improved 3 to 4 days ago, but subsequently deteriorated. He reports nasal congestion at night which interrupts his sleep and in the morning. His eyes began to exhibit puffiness and redness. His right nostril is sore, and he began experiencing headaches this morning. He denies any recent exposure to sick individuals, fever, chills, cough, chest congestion, or sore throat. He experienced itchy ears for a few days during this period. His nasal discharge transitioned from clear to green last couple days. He has been taking prednisone 5 mg daily for ACTH deficiency for 30 years, which he increases to 10 mg when he is ill or stressed. However, this has not provided significant relief. He reports fatigue due to lack of sleep due to nasal congestion. His breathing is currently unaffected, but he occasionally experiences ear itching. He uses Q-tips to clean his ears.    Supplemental Information  He is trying to get his blood pressure down. He has tried different blood pressure medications, but they did not work. He was having too many side effects. His endocrinologist thought he  was getting a little bit towards hyperaldosteronism. He was having so much problem with prickly heat that he could not go outside. His endocrinologist told him to take spironolactone at night, which he did not know he could do. He is now taking it at night.  He has not checked his blood pressure at home since he has gone to Dr. Bryson.         CHRONIC CONDITIONS      Past Medical History:   Diagnosis Date    ACTH deficiency     Arthritis     Bursitis of elbow     Colon polyp 2006    Elevated cholesterol     Spouse reported slightly elevated but no medication    Essential hypertension 2022    History of fracture     Left ankle in the 80's     [place] (hard of hearing)     Spouse reported patient has hearing aids but that he typically does not wear them    Hypercholesterolemia with LDL greater than 190 mg/dL 2022    Hyperlipidemia     Multiple lung nodules on CT 2022    LDCT screening 2022 - recommended repeat CT 6mo    Positive colorectal cancer screening using Cologuard test 2019    Added automatically from request for surgery [id number]    Tremor 2013    Wears glasses     OTC glasses for reading       Past Surgical History:   Procedure Laterality Date    COLONOSCOPY      COLONOSCOPY N/A 2019    Procedure: COLONOSCOPY W/ COLD SNARE POLYPECTOMY;  Surgeon: Elmo Sanchez MD;  Location: [facility] ENDOSCOPY;  Service: Gastroenterology    ENDOSCOPY      TONSILLECTOMY      WISDOM TOOTH EXTRACTION         Family History   Problem Relation Age of Onset    Cancer Father     Arthritis Father     Diabetes Father     Heart attack Paternal Grandfather        Social History     Socioeconomic History    Marital status:    Tobacco Use    Smoking status: Former     Current packs/day: 0.00     Average packs/day: 0.7 packs/day for 60.0 years (40.0 ttl pk-yrs)     Types: Cigarettes     Start date: 1982     Quit date: 2022     Years since quittin.3    Smokeless tobacco: Never    Tobacco  "comments:     Quit 2/1/2022   Vaping Use    Vaping status: Never Used   Substance and Sexual Activity    Alcohol use: Not Currently     Comment: very rarely    Drug use: Never    Sexual activity: Not Currently     Partners: Female     Birth control/protection: Condom       Allergies   Allergen Reactions    Bupropion Other (See Comments)     Spouse reported \"he had a fever from it\"  Spouse reported \"he had a fever from it\"    Ace Inhibitors Cough    Nifedipine Er Other (See Comments)     Flushing, burning feeling.     Zetia [Ezetimibe] Diarrhea and Rash     Resolved after discontinuing medication       Review of Systems:     Review of Systems    Vital Signs  Vitals:    06/06/24 1113   BP: 140/94   BP Location: Left arm   Patient Position: Sitting   Cuff Size: Adult   Pulse: 64   Temp: 98.2 °F (36.8 °C)   TempSrc: Infrared   SpO2: 99%   Weight: 104 kg (230 lb)   Height: 175.3 cm (69.02\")   PainSc: 0-No pain     Body mass index is 33.95 kg/m².  BMI is >= 30 and <35. (Class 1 Obesity). The following options were offered after discussion;: weight loss educational material (shared in after visit summary) and exercise counseling/recommendations          Current Outpatient Medications:     cholecalciferol (VITAMIN D3) 25 MCG (1000 UT) tablet, Take 1 tablet by mouth Daily., Disp: , Rfl:     predniSONE (DELTASONE) 5 MG tablet, TAKE 1 TABLET BY MOUTH IN THE MORNING PLUS EXTRA AS DIRECTED FOR ILLNESS, MAX EXTRA 5 TABS PER MONTH, Disp: 105 tablet, Rfl: 1    spironolactone (ALDACTONE) 25 MG tablet, Take 1 tablet by mouth Daily., Disp: 30 tablet, Rfl: 5    azithromycin (Zithromax Z-Huseyin) 250 MG tablet, Take 2 tablets the first day, then 1 tablet daily for 4 days., Disp: 6 tablet, Rfl: 0    fexofenadine (Allegra Allergy) 60 MG tablet, Take 1 tablet by mouth 2 (Two) Times a Day., Disp: 60 tablet, Rfl: 0    fluticasone (FLONASE) 50 MCG/ACT nasal spray, 1 spray into the nostril(s) as directed by provider 2 (Two) Times a Day., Disp: 16 " g, Rfl: 11    Physical Exam:    Physical Exam  Vitals and nursing note reviewed.   Constitutional:       General: He is not in acute distress.     Appearance: He is well-developed. He is not toxic-appearing.   HENT:      Head: Normocephalic.      Right Ear: Tympanic membrane and ear canal normal.      Left Ear: Tympanic membrane and ear canal normal.      Ears:      Comments: There are signs of excoriation from Q-tips in his ears.     Nose: Mucosal edema, congestion and rhinorrhea present.      Right Turbinates: Enlarged and swollen.      Left Turbinates: Enlarged and swollen.      Comments: Erythema in both nares     Mouth/Throat:      Mouth: Mucous membranes are moist.      Pharynx: Oropharynx is clear.   Eyes:      Conjunctiva/sclera: Conjunctivae normal.      Right eye: Right conjunctiva is not injected.      Pupils: Pupils are equal, round, and reactive to light.      Comments: Puffy swelling and redness around both eyes involving upper and lower eyelids   Neck:      Thyroid: No thyromegaly.   Cardiovascular:      Rate and Rhythm: Normal rate and regular rhythm.      Heart sounds: Normal heart sounds.   Pulmonary:      Effort: Pulmonary effort is normal.      Breath sounds: Normal breath sounds. No wheezing.   Musculoskeletal:         General: Normal range of motion.      Cervical back: Normal range of motion and neck supple.   Lymphadenopathy:      Cervical: No cervical adenopathy.   Skin:     General: Skin is warm and dry.   Neurological:      Mental Status: He is alert and oriented to person, place, and time.   Psychiatric:         Thought Content: Thought content normal.          ACE III MINI        Results Review:    I reviewed the patient's new clinical results.  Results  Laboratory Studies  Flu and Covid tests were both negative.       CMP:  Lab Results   Component Value Date    BUN 19 05/28/2024    CREATININE 1.07 05/28/2024    EGFRIFNONA 76 12/26/2017    EGFRIFAFRI 92 12/26/2017    BCR 17.8 05/28/2024  "    05/28/2024    K 4.6 05/28/2024    CO2 26.8 05/28/2024    CALCIUM 9.3 05/28/2024    PROTENTOTREF 6.1 04/21/2023    ALBUMIN 4.0 05/28/2024    LABGLOBREF 2.5 04/21/2023    LABIL2 1.4 04/21/2023    BILITOT 0.2 05/28/2024    ALKPHOS 67 05/28/2024    AST 16 05/28/2024    ALT 21 05/28/2024     HbA1c:  Lab Results   Component Value Date    HGBA1C 5.3 12/29/2020    HGBA1C 5.30 12/26/2017     Microalbumin:  No results found for: \"MICROALBUR\", \"POCMALB\", \"POCCREAT\"  Lipid Panel  Lab Results   Component Value Date    CHOL 254 (H) 05/28/2024    TRIG 107 05/28/2024    HDL 49 05/28/2024     (H) 05/28/2024    AST 16 05/28/2024    ALT 21 05/28/2024       Medication Review: Medications reviewed and noted  Patient Instructions   Problem List Items Addressed This Visit          Allergies and Adverse Reactions    Seasonal allergic rhinitis due to pollen    Relevant Medications    predniSONE (DELTASONE) 5 MG tablet       Cardiac and Vasculature    Essential hypertension (Chronic)    Relevant Medications    spironolactone (ALDACTONE) 25 MG tablet       ENT    Nasal congestion    Relevant Orders    POCT SARS-CoV-2 + Flu Antigen KINGA (Completed)    Upper respiratory tract infection - Primary    Relevant Medications    azithromycin (Zithromax Z-Huseyin) 250 MG tablet       Eye    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 Upper respiratory tract infection, unspecified type        2. Seasonal allergic rhinitis due to pollen        3. Nasal congestion  POCT SARS-CoV-2 + Flu Antigen KINGA      4. Headache around the eyes        5. Squamous blepharitis of upper and lower eyelids of both eyes        6. Essential hypertension          Assessment & Plan  1. Upper respiratory tract infection.  Given the persistence of symptoms for several weeks, which initially showed improvement, the condition has since deteriorated. Concurrently, the patient " is experiencing green drainage and is immunocompromised due to chronic prednisone. A Z-Huseyin has been prescribed, which has been sent to the pharmacy. The patient has been advised to use Flonase nasal spray twice daily and to take an Allegra antihistamine twice daily as needed.    2. Seasonal allergic rhinitis.  The onset of symptoms is believed to be allergy-related. The patient has been advised to use Flonase nasal spray twice daily daily and add an Allegra antihistamine as needed.    3. Nasal congestion.  See plan above.  4. Headache around the eyes.  The patient may also take Tylenol as needed.    5. Squamous blepharitis of both eyes.  The patient has been advised to apply warm compresses 3 to 4 times during the day and to avoid rubbing the eyes.    6. Hypertension.  The patient will maintain the daily dose of spironolactone. He will monitor his blood pressure at home and schedule a follow-up appointment with Dr. Cramer for about a month from now.         Plan of care reviewed with patient at the conclusion of today's visit. Education was provided regarding diagnosis, management, and any prescribed or recommended OTC medications. Patient verbalizes understanding of and agreement with management plan.         06/06/24   11:18 EDT    Patient or patient representative verbalized consent for the use of Ambient Listening during the visit with  Mariam Cormier MD for chart documentation. 6/6/2024  12:52 EDT

## 2024-06-25 RX ORDER — FEXOFENADINE HCL 60 MG/1
60 TABLET, FILM COATED ORAL 2 TIMES DAILY
Qty: 180 TABLET | Refills: 1 | Status: SHIPPED | OUTPATIENT
Start: 2024-06-25

## 2024-07-20 DIAGNOSIS — I10 ESSENTIAL HYPERTENSION: ICD-10-CM

## 2024-07-22 RX ORDER — SPIRONOLACTONE 25 MG/1
25 TABLET ORAL DAILY
Qty: 90 TABLET | Refills: 0 | Status: SHIPPED | OUTPATIENT
Start: 2024-07-22

## 2024-07-22 NOTE — TELEPHONE ENCOUNTER
Rx Refill Note    Requested Prescriptions     Pending Prescriptions Disp Refills    spironolactone (ALDACTONE) 25 MG tablet [Pharmacy Med Name: SPIRONOLACTONE 25 MG TABLET] 90 tablet 1     Sig: TAKE 1 TABLET BY MOUTH EVERY DAY        Last office visit with prescribing clinician: 5/28/2024         Next office visit with prescribing clinician: 12/3/2024

## 2024-07-24 ENCOUNTER — TELEPHONE (OUTPATIENT)
Dept: SURGERY | Facility: CLINIC | Age: 69
End: 2024-07-24
Payer: MEDICARE

## 2024-08-06 ENCOUNTER — OFFICE VISIT (OUTPATIENT)
Dept: INTERNAL MEDICINE | Facility: CLINIC | Age: 69
End: 2024-08-06
Payer: MEDICARE

## 2024-08-06 VITALS
DIASTOLIC BLOOD PRESSURE: 86 MMHG | TEMPERATURE: 97.8 F | HEART RATE: 64 BPM | WEIGHT: 226.6 LBS | HEIGHT: 71 IN | BODY MASS INDEX: 31.72 KG/M2 | SYSTOLIC BLOOD PRESSURE: 118 MMHG

## 2024-08-06 DIAGNOSIS — L74.0 HEAT RASH: ICD-10-CM

## 2024-08-06 DIAGNOSIS — E78.00 HYPERCHOLESTEROLEMIA WITH LDL GREATER THAN 190 MG/DL: Chronic | ICD-10-CM

## 2024-08-06 DIAGNOSIS — Z87.891 PERSONAL HISTORY OF TOBACCO USE, PRESENTING HAZARDS TO HEALTH: ICD-10-CM

## 2024-08-06 DIAGNOSIS — Z00.00 ENCOUNTER FOR SUBSEQUENT ANNUAL WELLNESS VISIT (AWV) IN MEDICARE PATIENT: Primary | ICD-10-CM

## 2024-08-06 DIAGNOSIS — E66.9 OBESITY (BMI 30.0-34.9): ICD-10-CM

## 2024-08-06 DIAGNOSIS — I10 ESSENTIAL HYPERTENSION: Chronic | ICD-10-CM

## 2024-08-06 NOTE — PROGRESS NOTES
Subjective   The ABCs of the Annual Wellness Visit  Medicare Wellness Visit    From [place]. Moved to [place] 2013. Worked as  in the past, retired 2018. . No children.     Patient has a past medical history of ACTH deficiency with secondary adrenal insufficiency, hypertension (2022), hyperlipidemia, colon adenoma, cataracts and presbyopia.    Yovany Gonzalez is a 69 y.o. patient who presents for a Medicare Wellness Visit.    The following portions of the patient's history were reviewed and   updated as appropriate: allergies, current medications, past family history, past medical history, past social history, past surgical history, and problem list.    Compared to one year ago, the patient's physical   health is the same.  Compared to one year ago, the patient's mental   health is worse.    Recent Hospitalizations:  He was not admitted to the hospital during the last year.     Current Medical Providers:  Patient Care Team:  Anthony Cramer MD as PCP - General (Family Medicine)  Elmo Sanchez MD as Consulting Physician (General Surgery)  Xenia Bryson MD as Consulting Physician (Endocrinology)    Outpatient Medications Prior to Visit   Medication Sig Dispense Refill    cholecalciferol (VITAMIN D3) 25 MCG (1000 UT) tablet Take 1 tablet by mouth Daily.      predniSONE (DELTASONE) 5 MG tablet TAKE 1 TABLET BY MOUTH IN THE MORNING PLUS EXTRA AS DIRECTED FOR ILLNESS, MAX EXTRA 5 TABS PER MONTH 105 tablet 1    spironolactone (ALDACTONE) 25 MG tablet TAKE 1 TABLET BY MOUTH EVERY DAY 90 tablet 0    fexofenadine (Allegra Allergy) 60 MG tablet Take 1 tablet by mouth 2 (Two) Times a Day. 180 tablet 1    fluticasone (FLONASE) 50 MCG/ACT nasal spray 1 spray into the nostril(s) as directed by provider 2 (Two) Times a Day. 16 g 11    azithromycin (Zithromax Z-Huseyin) 250 MG tablet Take 2 tablets the first day, then 1 tablet daily for 4 days. (Patient not taking: Reported on 8/6/2024) 6 tablet 0  "    No facility-administered medications prior to visit.     No opioid medication identified on active medication list. I have reviewed chart for other potential  high risk medication/s and harmful drug interactions in the elderly.      Aspirin is not on active medication list.  Aspirin use is not indicated based on review of current medical condition/s. Risk of harm outweighs potential benefits.  .    Patient Active Problem List   Diagnosis    Secondary adrenal insufficiency    Dyslipidemia    Tubular adenoma of colon    Bilateral presbyopia    Cataract, nuclear sclerotic, both eyes    Essential hypertension    Multiple lung nodules on CT    Cardiomegaly    LVH (left ventricular hypertrophy) due to hypertensive disease, without heart failure    Xanthelasma of eyelid, bilateral    Tobacco abuse    Obesity (BMI 30.0-34.9)    Statin intolerance    Hypercholesterolemia with LDL greater than 190 mg/dL    ACTH deficiency    Nasal congestion    Squamous blepharitis of upper and lower eyelids of both eyes    Headache around the eyes    Seasonal allergic rhinitis due to pollen    Upper respiratory tract infection     Advance Care Planning Advance Directive is not on file.  ACP discussion was held with the patient during this visit. Patient does not have an advance directive, information provided.            Objective   Vitals:    08/06/24 1052 08/06/24 1204   BP: 130/98 118/86   BP Location: Left arm Left arm   Patient Position: Sitting Sitting   Cuff Size: Large Adult Adult   Pulse: 64    Temp: 97.8 °F (36.6 °C)    TempSrc: Temporal    Weight: 103 kg (226 lb 9.6 oz)    Height: 179.1 cm (70.5\")        Estimated body mass index is 32.05 kg/m² as calculated from the following:    Height as of this encounter: 179.1 cm (70.5\").    Weight as of this encounter: 103 kg (226 lb 9.6 oz).            Does the patient have evidence of cognitive impairment? No  Lab Results   Component Value Date    TRIG 107 05/28/2024    HDL 49 " 2024     (H) 2024    VLDL 19 2024                                                                                                Health  Risk Assessment    Smoking Status:  Social History     Tobacco Use   Smoking Status Former    Current packs/day: 0.00    Average packs/day: 0.7 packs/day for 60.0 years (40.0 ttl pk-yrs)    Types: Cigarettes    Start date: 1982    Quit date: 2022    Years since quittin.5   Smokeless Tobacco Never   Tobacco Comments    Quit 2022     Alcohol Consumption:  Social History     Substance and Sexual Activity   Alcohol Use Not Currently    Comment: very rarely       Fall Risk Screen  STEADI Fall Risk Assessment was completed, and patient is at LOW risk for falls.Assessment completed on:2024    Depression Screenin/6/2024    11:00 AM   PHQ-2/PHQ-9 Depression Screening   Little Interest or Pleasure in Doing Things 1-->several days   Feeling Down, Depressed or Hopeless 0-->not at all   PHQ-9: Brief Depression Severity Measure Score 1     Health Habits and Functional and Cognitive Screenin/30/2024     8:28 AM   Functional & Cognitive Status   Do you have difficulty preparing food and eating? No   Do you have difficulty bathing yourself, getting dressed or grooming yourself? No   Do you have difficulty using the toilet? No   Do you have difficulty moving around from place to place? No   Do you have trouble with steps or getting out of a bed or a chair? No   Current Diet Well Balanced Diet   Dental Exam Up to date   Eye Exam Up to date   Exercise (times per week) 5 times per week   Current Exercises Include House Cleaning;Walking;Yard Work   Do you need help using the phone?  No   Are you deaf or do you have serious difficulty hearing?  No   Do you need help to go to places out of walking distance? No   Do you need help shopping? No   Do you need help preparing meals?  No   Do you need help with housework?  No   Do you need help with  laundry? No   Do you need help taking your medications? No   Do you need help managing money? No   Do you ever drive or ride in a car without wearing a seat belt? No   Have you felt unusual stress, anger or loneliness in the last month? No   Who do you live with? Spouse   If you need help, do you have trouble finding someone available to you? No   Have you been bothered in the last four weeks by sexual problems? No   Do you have difficulty concentrating, remembering or making decisions? No           Age-appropriate Screening Schedule:  Refer to the list below for future screening recommendations based on patient's age, sex and/or medical conditions. Orders for these recommended tests are listed in the plan section. The patient has been provided with a written plan.    Health Maintenance List  Health Maintenance   Topic Date Due    INFLUENZA VACCINE  08/01/2024    COLORECTAL CANCER SCREENING  08/20/2024    LUNG CANCER SCREENING  12/05/2024    LIPID PANEL  05/28/2025    BMI FOLLOWUP  06/06/2025    ANNUAL WELLNESS VISIT  08/06/2025    TDAP/TD VACCINES (2 - Td or Tdap) 09/10/2031    HEPATITIS C SCREENING  Completed    COVID-19 Vaccine  Completed    Pneumococcal Vaccine 65+  Completed    AAA SCREEN (ONE-TIME)  Completed    ZOSTER VACCINE  Completed                                                                                                                                                CMS Preventative Services Quick Reference  Risk Factors Identified During Encounter  Immunizations Discussed/Encouraged: Influenza and COVID19    The above risks/problems have been discussed with the patient.  Pertinent information has been shared with the patient in the After Visit Summary.  An After Visit Summary and PPPS were made available to the patient.    Follow Up:   Next Medicare Wellness visit to be scheduled in 1 year.         Additional E&M Note during same encounter follows:  Patient has additional, significant, and  "separately identifiable condition(s)/problem(s) that require work above and beyond the Medicare Wellness Visit     Chief Complaint  Medicare Wellness-subsequent    Subjective   HPI  Yovany is also being seen today for additional medical problem/s.        Patient is following with endocrinology Dr. Bryson.  There is concern for possible hyperaldosteronism.  Patient started on spironolactone and reports his blood pressures typically range around 140s/90.  Patient is thinking about going back to the [facility] for evaluation.    He also reports since starting on medications for blood pressure he tends to break out when he goes outside.  If he gets warm his chest will break out with bumps and red rash.  This will resolve right away if he goes inside and stands  in front of the air conditioner.  He feels this is affecting his mental health, he is not able to go out due to developing symptoms consistently.  He did try to take the prednisone in the evening instead of in the morning, and for a few days he was able to go out and do his regular activities.      PHQ-2 Depression Screening  Little interest or pleasure in doing things? 1-->several days   Feeling down, depressed, or hopeless? 0-->not at all   PHQ-2 Total Score 1             Objective   Vital Signs:  /86 (BP Location: Left arm, Patient Position: Sitting, Cuff Size: Adult)   Pulse 64   Temp 97.8 °F (36.6 °C) (Temporal)   Ht 179.1 cm (70.5\")   Wt 103 kg (226 lb 9.6 oz)   BMI 32.05 kg/m²   Physical Exam  Constitutional:       Appearance: Normal appearance.   HENT:      Head: Normocephalic and atraumatic.   Eyes:      Extraocular Movements: Extraocular movements intact.      Conjunctiva/sclera: Conjunctivae normal.   Pulmonary:      Effort: Pulmonary effort is normal. No respiratory distress.   Musculoskeletal:      Cervical back: Normal range of motion and neck supple.   Neurological:      Mental Status: He is alert and oriented to person, place, and time. " Mental status is at baseline.   Psychiatric:         Behavior: Behavior normal.         Thought Content: Thought content normal.                 Assessment and Plan     1. Encounter for subsequent annual wellness visit (AWV) in Medicare patient    2. Essential hypertension  BP Readings from Last 3 Encounters:   08/06/24 118/86   06/06/24 140/94   05/28/24 136/82   Home blood pressures indicate not well-controlled blood pressure, initial blood pressure in the office elevated, repeat improved.  Continue on spironolactone 25 mg and follow-up with endocrinology.    3. Heat rash  Patient reports symptoms consistently when he is outside, and reports this started after he started on medications.  It is possible this is a form of heat urticaria.  I have added patient reference to after visit summary.  He has already tried antihistamines, and reports this does not help.  Consider dermatology evaluation.    4. Hypercholesterolemia with LDL greater than 190 mg/dL  The 10-year ASCVD risk score (Cordelia RODRIGUEZ, et al., 2019) is: 18.4%    Values used to calculate the score:      Age: 69 years      Sex: Male      Is Non- : No      Diabetic: No      Tobacco smoker: No      Systolic Blood Pressure: 118 mmHg      Is BP treated: Yes      HDL Cholesterol: 49 mg/dL      Total Cholesterol: 254 mg/dL  Patient did not tolerate medications including statins and Zetia.    5. Personal history of tobacco use, presenting hazards to health  Patient wants to continue screening for lung cancer.  His LDCT is due 12/5/2024, patient is requesting scheduling in January  - CT chest low dose wo; Future    6. Obesity (BMI 30.0-34.9)  \        Encounter for subsequent annual wellness visit (AWV) in Medicare patient    Personal history of tobacco use, presenting hazards to health    Essential hypertension      Orders Placed This Encounter   Procedures    CT chest low dose wo     Standing Status:   Future     Standing Expiration Date:    8/6/2025     Scheduling Instructions:      Pt requesting schedule January 2025     Order Specific Question:   Reason for Exam:     Answer:   Screening. Not due until 12/5/2024             Follow Up   No follow-ups on file.  Future Appointments         Provider Department Center    12/3/2024 10:30 AM (Arrive by 10:15 AM) Xenia Bryson MD [facility] ENDOCRINOLOGY [place]    2/6/2025 11:00 AM Anthony Cramer MD [facility] INTERNAL MEDICINE [place]              Patient was given instructions and counseling regarding his condition or for health maintenance advice. Please see specific information pulled into the AVS if appropriate.    Anthony Cramer MD

## 2024-08-22 ENCOUNTER — TELEPHONE (OUTPATIENT)
Dept: SURGERY | Facility: CLINIC | Age: 69
End: 2024-08-22
Payer: OTHER GOVERNMENT

## 2024-08-22 RX ORDER — POLYETHYLENE GLYCOL 3350 17 G/17G
POWDER, FOR SOLUTION ORAL
Qty: 238 G | Refills: 0 | Status: SHIPPED | OUTPATIENT
Start: 2024-08-22

## 2024-08-22 RX ORDER — BISACODYL 5 MG/1
TABLET, DELAYED RELEASE ORAL
Qty: 4 TABLET | Refills: 0 | Status: SHIPPED | OUTPATIENT
Start: 2024-08-22

## 2024-08-22 NOTE — TELEPHONE ENCOUNTER
Pt would like to be scheduled at [facility] on 09/17 W/ Dr. Sanchez-verified pharmacy/insurance. Thank you!    5 YR RECALL

## 2024-08-22 NOTE — TELEPHONE ENCOUNTER
PRESCREENING FOR OPEN ACCESS SCHEDULING    Yovany Gonzalez, 1955  [id number]    08/22/24    If, the patient answers yes to any of the following questions the provider will be informed prior to scheduling open access for approval and documented in the chart.    [x]  Yes  [] No    1. Have you ever had a colonoscopy in the past?      When:  08/20/2019      Where: [facility]      Polyps or other: Yes     []  Yes  [x] No    2. Family history of colon cancer?      Relation:       Age of onset:       Do you currently have any of the following?    []  Yes  [x] No  Rectal bleeding, if so, how long?     []  Yes  [x] No  Abdominal pain, if so, how long?    []  Yes  [x] No  Constipation, if so, how long?    []  Yes  [x] No  Diarrhea, if so, how long?    []  Yes  [x] No  Weight loss, is so, how much?    [] Yes  [x] No  Small caliber stool, if so, how long?    []Yes  [x] No  Do you have Hemorroids?    []Yes  [x] No  Have you been diagnosed with Anemia?    []Yes  [x] No  Do you have difficulty swallowing?    []Yes  [x] No  Do you have acid reflux?    Have you ever had any of the following conditions?    [] Yes  [x] No  Heart attack?      When?       Last cardiac workup?     Blood thinners?    [x] Yes  [] No   Lung problems, asthma or COPD? Lung Nodules-have a annual CT Chest done to monitor these    [] Yes  [x] No  Oxygen required?       [] Yes  [x] No  Stroke?     [] Yes  [x] No  Have you ever had a reaction to anesthesia?

## 2024-08-23 ENCOUNTER — PREP FOR SURGERY (OUTPATIENT)
Dept: OTHER | Facility: HOSPITAL | Age: 69
End: 2024-08-23
Payer: OTHER GOVERNMENT

## 2024-08-23 DIAGNOSIS — Z12.11 ENCOUNTER FOR COLONOSCOPY DUE TO HISTORY OF ADENOMATOUS COLONIC POLYPS: Primary | ICD-10-CM

## 2024-08-23 DIAGNOSIS — Z86.010 ENCOUNTER FOR COLONOSCOPY DUE TO HISTORY OF ADENOMATOUS COLONIC POLYPS: Primary | ICD-10-CM

## 2024-08-27 PROBLEM — Z12.11 ENCOUNTER FOR COLONOSCOPY DUE TO HISTORY OF ADENOMATOUS COLONIC POLYPS: Status: ACTIVE | Noted: 2024-08-23

## 2024-08-27 PROBLEM — Z86.010 ENCOUNTER FOR COLONOSCOPY DUE TO HISTORY OF ADENOMATOUS COLONIC POLYPS: Status: ACTIVE | Noted: 2024-08-23

## 2024-08-27 PROBLEM — Z86.0101 ENCOUNTER FOR COLONOSCOPY DUE TO HISTORY OF ADENOMATOUS COLONIC POLYPS: Status: ACTIVE | Noted: 2024-08-23

## 2024-09-12 ENCOUNTER — TELEPHONE (OUTPATIENT)
Dept: ENDOCRINOLOGY | Facility: CLINIC | Age: 69
End: 2024-09-12
Payer: MEDICARE

## 2024-09-12 DIAGNOSIS — I10 ESSENTIAL HYPERTENSION: Primary | ICD-10-CM

## 2024-09-12 RX ORDER — EPLERENONE 25 MG/1
25 TABLET, FILM COATED ORAL DAILY
Qty: 30 TABLET | Refills: 5 | Status: SHIPPED | OUTPATIENT
Start: 2024-09-12

## 2024-09-12 NOTE — TELEPHONE ENCOUNTER
Pt stopped by office stated he was having side effect from Spironolactone 25 mg sore in chest and breast still has a little on left not too bad pt stopped medication 3 weeks on 08/18/24 blood pressure has been high 153/93. Pt wants to know the next if there is any other options. Please contact pt

## 2024-09-12 NOTE — TELEPHONE ENCOUNTER
Spoke to patient and wife. Pt developed breast tenderness on the spironolactone. Will send Rx for eplerenone 25 mg daily to his pharmacy. Pt to keep BP log and let me know his readings.   Electronically Signed: Xenia Bryson MD

## 2024-09-17 ENCOUNTER — HOSPITAL ENCOUNTER (OUTPATIENT)
Facility: HOSPITAL | Age: 69
Setting detail: HOSPITAL OUTPATIENT SURGERY
Discharge: HOME | End: 2024-09-17
Attending: SURGERY | Admitting: SURGERY
Payer: MEDICARE

## 2024-09-17 ENCOUNTER — ANESTHESIA EVENT (OUTPATIENT)
Dept: GASTROENTEROLOGY | Facility: HOSPITAL | Age: 69
End: 2024-09-17
Payer: MEDICARE

## 2024-09-17 ENCOUNTER — ANESTHESIA (OUTPATIENT)
Dept: GASTROENTEROLOGY | Facility: HOSPITAL | Age: 69
End: 2024-09-17
Payer: MEDICARE

## 2024-09-17 VITALS
BODY MASS INDEX: 31.5 KG/M2 | RESPIRATION RATE: 16 BRPM | WEIGHT: 225 LBS | HEIGHT: 71 IN | HEART RATE: 57 BPM | DIASTOLIC BLOOD PRESSURE: 88 MMHG | TEMPERATURE: 98.5 F | SYSTOLIC BLOOD PRESSURE: 141 MMHG | OXYGEN SATURATION: 98 %

## 2024-09-17 PROCEDURE — S0260 H&P FOR SURGERY: HCPCS | Performed by: SURGERY

## 2024-09-17 PROCEDURE — G0105 COLORECTAL SCRN; HI RISK IND: HCPCS | Performed by: SURGERY

## 2024-09-17 PROCEDURE — 25010000002 PROPOFOL 10 MG/ML EMULSION: Performed by: NURSE ANESTHETIST, CERTIFIED REGISTERED

## 2024-09-17 PROCEDURE — 25810000003 LACTATED RINGERS PER 1000 ML: Performed by: SURGERY

## 2024-09-17 RX ORDER — ONDANSETRON 2 MG/ML
4 INJECTION INTRAMUSCULAR; INTRAVENOUS ONCE AS NEEDED
Status: CANCELLED | OUTPATIENT
Start: 2024-09-17

## 2024-09-17 RX ORDER — SODIUM CHLORIDE, SODIUM LACTATE, POTASSIUM CHLORIDE, CALCIUM CHLORIDE 600; 310; 30; 20 MG/100ML; MG/100ML; MG/100ML; MG/100ML
1000 INJECTION, SOLUTION INTRAVENOUS CONTINUOUS
Status: DISCONTINUED | OUTPATIENT
Start: 2024-09-17 | End: 2024-09-17 | Stop reason: HOSPADM

## 2024-09-17 RX ORDER — ONDANSETRON 2 MG/ML
4 INJECTION INTRAMUSCULAR; INTRAVENOUS ONCE AS NEEDED
Status: DISCONTINUED | OUTPATIENT
Start: 2024-09-17 | End: 2024-09-17 | Stop reason: HOSPADM

## 2024-09-17 RX ORDER — SIMETHICONE 40MG/0.6ML
SUSPENSION, DROPS(FINAL DOSAGE FORM)(ML) ORAL AS NEEDED
Status: DISCONTINUED | OUTPATIENT
Start: 2024-09-17 | End: 2024-09-17 | Stop reason: HOSPADM

## 2024-09-17 RX ORDER — PROPOFOL 10 MG/ML
VIAL (ML) INTRAVENOUS AS NEEDED
Status: DISCONTINUED | OUTPATIENT
Start: 2024-09-17 | End: 2024-09-17 | Stop reason: SURG

## 2024-09-17 RX ADMIN — SODIUM CHLORIDE, POTASSIUM CHLORIDE, SODIUM LACTATE AND CALCIUM CHLORIDE 1000 ML: 600; 310; 30; 20 INJECTION, SOLUTION INTRAVENOUS at 12:04

## 2024-09-17 RX ADMIN — PROPOFOL 300 MG: 10 INJECTION, EMULSION INTRAVENOUS at 12:22

## 2024-09-17 RX ADMIN — LIDOCAINE HYDROCHLORIDE 100 MG: 20 INJECTION, SOLUTION INTRAVENOUS at 12:22

## 2024-12-03 ENCOUNTER — OFFICE VISIT (OUTPATIENT)
Dept: ENDOCRINOLOGY | Facility: CLINIC | Age: 69
End: 2024-12-03
Payer: MEDICARE

## 2024-12-03 VITALS
BODY MASS INDEX: 32.84 KG/M2 | HEART RATE: 80 BPM | HEIGHT: 71 IN | DIASTOLIC BLOOD PRESSURE: 100 MMHG | SYSTOLIC BLOOD PRESSURE: 144 MMHG | WEIGHT: 234.6 LBS | OXYGEN SATURATION: 99 %

## 2024-12-03 DIAGNOSIS — E27.49 SECONDARY ADRENAL INSUFFICIENCY: Primary | ICD-10-CM

## 2024-12-03 DIAGNOSIS — I10 ESSENTIAL HYPERTENSION: ICD-10-CM

## 2024-12-03 DIAGNOSIS — E78.00 HYPERCHOLESTEROLEMIA WITH LDL GREATER THAN 190 MG/DL: ICD-10-CM

## 2024-12-03 DIAGNOSIS — R21 RASH: ICD-10-CM

## 2024-12-03 LAB
ALBUMIN SERPL-MCNC: 4 G/DL (ref 3.5–5.2)
ALBUMIN/GLOB SERPL: 1.4 G/DL
ALP SERPL-CCNC: 59 U/L (ref 39–117)
ALT SERPL W P-5'-P-CCNC: 16 U/L (ref 1–41)
ANION GAP SERPL CALCULATED.3IONS-SCNC: 8.4 MMOL/L (ref 5–15)
AST SERPL-CCNC: 19 U/L (ref 1–40)
BILIRUB SERPL-MCNC: 0.3 MG/DL (ref 0–1.2)
BUN SERPL-MCNC: 20 MG/DL (ref 8–23)
BUN/CREAT SERPL: 19.4 (ref 7–25)
CALCIUM SPEC-SCNC: 9.7 MG/DL (ref 8.6–10.5)
CHLORIDE SERPL-SCNC: 106 MMOL/L (ref 98–107)
CO2 SERPL-SCNC: 25.6 MMOL/L (ref 22–29)
CREAT SERPL-MCNC: 1.03 MG/DL (ref 0.76–1.27)
EGFRCR SERPLBLD CKD-EPI 2021: 79.1 ML/MIN/1.73
GLOBULIN UR ELPH-MCNC: 2.9 GM/DL
GLUCOSE SERPL-MCNC: 87 MG/DL (ref 65–99)
POTASSIUM SERPL-SCNC: 4.2 MMOL/L (ref 3.5–5.2)
PROT SERPL-MCNC: 6.9 G/DL (ref 6–8.5)
SODIUM SERPL-SCNC: 140 MMOL/L (ref 136–145)

## 2024-12-03 PROCEDURE — 80053 COMPREHEN METABOLIC PANEL: CPT | Performed by: INTERNAL MEDICINE

## 2024-12-03 PROCEDURE — 99214 OFFICE O/P EST MOD 30 MIN: CPT | Performed by: INTERNAL MEDICINE

## 2024-12-03 PROCEDURE — 3080F DIAST BP >= 90 MM HG: CPT | Performed by: INTERNAL MEDICINE

## 2024-12-03 PROCEDURE — 36415 COLL VENOUS BLD VENIPUNCTURE: CPT | Performed by: INTERNAL MEDICINE

## 2024-12-03 PROCEDURE — 3077F SYST BP >= 140 MM HG: CPT | Performed by: INTERNAL MEDICINE

## 2024-12-03 RX ORDER — EPLERENONE 25 MG/1
50 TABLET, FILM COATED ORAL DAILY
Qty: 180 TABLET | Refills: 1 | Status: SHIPPED | OUTPATIENT
Start: 2024-12-03

## 2024-12-03 RX ORDER — CETIRIZINE HYDROCHLORIDE 10 MG/1
10 TABLET ORAL DAILY
Qty: 90 TABLET | Refills: 3 | Status: SHIPPED | OUTPATIENT
Start: 2024-12-03

## 2024-12-03 NOTE — PROGRESS NOTES
Chief Complaint   Patient presents with    Adrenal Insufficiency    Hypertension     Follow-up       HPI:   Yovany Gonzalez is a 69 y.o.male who [facility] f/u evaluation and management of his secondary adrenal insufficiency. Last visit 05/28/2024. His history is as follows:    Interim Events:   - Reports no rash on the eplerenone 25 mg. However, reports SOA on the medication. See details below  - Taking prednisone as directed. May take an extra 1/2 tab when doing increased physical activity.     1) secondary adrenal insufficiency:  - diagnosed in 1994. Was living in [place] at the time. Presented to [facility] with symptoms of significant wt loss of 60 lbs, increased fatigue and hypersomnia. He recalls having serum AM and PM cortisol levels and a bone marrow biopsy as part of his evaluation. Not clear if he had any type of stimulation test at that time. MRI of [place] in 1994 and 1996 did not show an abnormality. Was told he had isolated ACTH deficiency.   - Has taken prednisone since time of diagnosis. Has tried BID dosing of hydrocortisone for approximately 1 month but did not feel well on this glucocorticoid.    Current Dose: prednisone 5 mg qAM  (May take an extra 1/2 tab when doing increased physical activity.)    Has not had to use stress dosing recently.  Not wearing a medical alert bracelet or necklace     DEXA ([facility], 09/13/2019) No osteoporosis, No osteopenia    2) familial hypercholesteremia:  - pt was prescribed rosuvastatin 20 mg daily by his PCP in 01/20201. (12/2020) Tchol 304, HDL 58, ,  prior to starting medication.  - Repeat Lipid panel 08/2021: Tchol 161, HDL 47, LDL 94,  - on Crestor 20 mg daily. Reported not tolerating the 20 mg dose of crestor. Did not tolerate 10 mg   - Did not tolerate other statins  - Pt stated his Zetia caused a rash when he is exposed to heat.  - pt did not try nexletol samples given to him at a prior visits    3)  "essential HTN:  - Had multiple rashes (heat sensitivity) on multiple HTN medications. I had pt complete multiple aldosterone/Renin labs to evaluate for hyperaldosteronism:  (2024, 9AM) aldosterone 12.7, renin 0.542 (0.167 - 5.380), A/R ratio 23.4 - on an ARB  (2024, 9AM) aldosterone 8.3, renin 0.275 (0.167 - 5.380), A/R ratio 30.1 - on no HTN medications    Although the patient pt did not have an elevated aldosterone level, his renin was low and his ratio was elevated. I discussed a trial of spironolactone with patient and he was agreeable to a trial of this medication.   (2024) Pt reported again rash on the spironolactone 25 mg. Switched to eplerenone 25 mg at that time to see if better tolerated    Current Rx: eplerenone 25 mg.   - denies rash on this formulation. However, reports SOA and chest tightness on this medication.     FMHx: No known CAD. Pt's mother  when he was 5 y.o. Paternal grandfather had an MI after age 60.     Review of Systems   Constitutional: Negative.  Negative for fatigue.        Wt stable   HENT:  Positive for hearing loss.    Eyes: Negative.    Respiratory: Negative.  Negative for cough.    Cardiovascular: Negative.    Gastrointestinal: Negative.    Endocrine: Positive for heat intolerance.   Genitourinary: Negative.    Musculoskeletal:  Positive for arthralgias. Negative for myalgias and neck stiffness.   Skin: Negative.    Allergic/Immunologic: Negative.    Neurological: Negative.  Negative for tremors and headaches.   Hematological: Negative.    Psychiatric/Behavioral: Negative.       The following portions of the patient's history were reviewed and updated as appropriate: allergies, current medications, past family history, past medical history, past social history, past surgical history and problem list.    /100   Pulse 80   Ht 180.3 cm (71\")   Wt 106 kg (234 lb 9.6 oz)   SpO2 99%   BMI 32.72 kg/m²   Physical Exam  Vitals reviewed.   Constitutional:     "   General: He is not in acute distress.     Appearance: He is well-developed. He is not diaphoretic.   HENT:      Head: Normocephalic.   Eyes:      Conjunctiva/sclera: Conjunctivae normal.      Pupils: Pupils are equal, round, and reactive to light.   Neck:      Thyroid: No thyromegaly.      Trachea: No tracheal deviation.      Comments: No palpable thyroid nodules    Cardiovascular:      Rate and Rhythm: Normal rate and regular rhythm.      Heart sounds: Normal heart sounds. No murmur heard.  Pulmonary:      Effort: Pulmonary effort is normal. No respiratory distress.      Breath sounds: Normal breath sounds.   Abdominal:      General: Bowel sounds are normal.      Palpations: Abdomen is soft. There is no mass.      Tenderness: There is no abdominal tenderness.   Musculoskeletal:      Cervical back: Neck supple.   Lymphadenopathy:      Cervical: No cervical adenopathy.   Skin:     General: Skin is warm and dry.      Findings: Lesion (eyelid xanthomas) present. No erythema.   Neurological:      Mental Status: He is alert and oriented to person, place, and time.      Cranial Nerves: No cranial nerve deficit.   Psychiatric:         Behavior: Behavior normal.       LABS/IMAGING: outside records reviewed and summarized in HPI  Office Visit on 12/03/2024   Component Date Value Ref Range Status    Glucose 12/03/2024 87  65 - 99 mg/dL Final    BUN 12/03/2024 20  8 - 23 mg/dL Final    Creatinine 12/03/2024 1.03  0.76 - 1.27 mg/dL Final    Sodium 12/03/2024 140  136 - 145 mmol/L Final    Potassium 12/03/2024 4.2  3.5 - 5.2 mmol/L Final    Chloride 12/03/2024 106  98 - 107 mmol/L Final    CO2 12/03/2024 25.6  22.0 - 29.0 mmol/L Final    Calcium 12/03/2024 9.7  8.6 - 10.5 mg/dL Final    Total Protein 12/03/2024 6.9  6.0 - 8.5 g/dL Final    Albumin 12/03/2024 4.0  3.5 - 5.2 g/dL Final    ALT (SGPT) 12/03/2024 16  1 - 41 U/L Final    AST (SGOT) 12/03/2024 19  1 - 40 U/L Final    Alkaline Phosphatase 12/03/2024 59  39 - 117 U/L  Final    Total Bilirubin 12/03/2024 0.3  0.0 - 1.2 mg/dL Final    Globulin 12/03/2024 2.9  gm/dL Final    A/G Ratio 12/03/2024 1.4  g/dL Final    BUN/Creatinine Ratio 12/03/2024 19.4  7.0 - 25.0 Final    Anion Gap 12/03/2024 8.4  5.0 - 15.0 mmol/L Final    eGFR 12/03/2024 79.1  >60.0 mL/min/1.73 Final       ASSESSMENT/PLAN:  1) secondary adrenal insufficiency:   - clinically without symptoms of AI  - Will continue prednisone 5 mg qAM plus extra as needed during illness.  May take an extra 1/2 tab when doing increased physical activity.   - Reviewed how to increase dosing during times of illness.   - Encouraged pt to wear a medical alert bracelet or necklace    2) essential HTN:  - Had multiple rashes (heat sensitivity) on multiple HTN medications. I had pt complete multiple aldosterone/Renin labs to evaluate for hyperaldosteronism:  (01/29/2024, 9AM) aldosterone 12.7, renin 0.542 (0.167 - 5.380), A/R ratio 23.4 - on an ARB  (02/23/2024, 9AM) aldosterone 8.3, renin 0.275 (0.167 - 5.380), A/R ratio 30.1 - on no HTN medications  - Although the patient pt did not have an elevated aldosterone level, his renin was low and his A/R ratio was elevated.   - He reported not tolerating spironolactone 25 mg daily due to rash.   - He is not having rash on the eplerenone; however, he reports SOA. I offered referral to an allergist for further evaluation as he reports rash with multiple antihypertensives. He has declined referral to allergist at this time.   - Will have pt increase dose of eplerenone to 50 mg daily. Advised pt to take trial of antihistamine (Zrytec) along with medication to see if Rx is better tolerated.      3) familial hypercholesteremia:  - pt was prescribed rosuvastatin 20 mg daily by his PCP in 01/20201. (12/2020) Tchol 304, HDL 58, ,  prior to starting medication.  - Repeat Lipid panel 08/2021: Tchol 161, HDL 47, LDL 94,  - on Crestor 20 mg daily. Reported not tolerating the 20 mg dose of  crestor. Did not tolerate 10 mg   - Did not tolerate other statins  - Pt stated his Zetia caused a rash when he is exposed to heat.   - pt did not try nexletol samples given to him at a prior visits  - Pt declines medication at this time    RTC 5 months    Electronically Signed: Xenia Bryson MD

## 2024-12-06 ENCOUNTER — HOSPITAL ENCOUNTER (OUTPATIENT)
Dept: CT IMAGING | Facility: HOSPITAL | Age: 69
Discharge: HOME | End: 2024-12-06
Payer: MEDICARE

## 2024-12-06 DIAGNOSIS — Z87.891 PERSONAL HISTORY OF TOBACCO USE, PRESENTING HAZARDS TO HEALTH: ICD-10-CM

## 2024-12-06 PROCEDURE — 71271 CT THORAX LUNG CANCER SCR C-: CPT

## 2025-02-06 ENCOUNTER — OFFICE VISIT (OUTPATIENT)
Dept: INTERNAL MEDICINE | Facility: CLINIC | Age: 70
End: 2025-02-06
Payer: MEDICARE

## 2025-02-06 VITALS
DIASTOLIC BLOOD PRESSURE: 78 MMHG | HEART RATE: 76 BPM | OXYGEN SATURATION: 97 % | WEIGHT: 236.2 LBS | TEMPERATURE: 98.4 F | BODY MASS INDEX: 33.07 KG/M2 | SYSTOLIC BLOOD PRESSURE: 124 MMHG | HEIGHT: 71 IN

## 2025-02-06 DIAGNOSIS — Z12.5 SCREENING PSA (PROSTATE SPECIFIC ANTIGEN): ICD-10-CM

## 2025-02-06 DIAGNOSIS — E27.49 SECONDARY ADRENAL INSUFFICIENCY: Chronic | ICD-10-CM

## 2025-02-06 DIAGNOSIS — H20.00 ACUTE IRITIS, LEFT EYE: ICD-10-CM

## 2025-02-06 DIAGNOSIS — I10 ESSENTIAL HYPERTENSION: Primary | Chronic | ICD-10-CM

## 2025-02-06 DIAGNOSIS — E55.9 VITAMIN D DEFICIENCY: ICD-10-CM

## 2025-02-06 DIAGNOSIS — E78.00 HYPERCHOLESTEROLEMIA WITH LDL GREATER THAN 190 MG/DL: Chronic | ICD-10-CM

## 2025-02-06 PROBLEM — H02.60 XANTHELASMA: Status: ACTIVE | Noted: 2024-01-17

## 2025-02-06 PROCEDURE — 1126F AMNT PAIN NOTED NONE PRSNT: CPT | Performed by: STUDENT IN AN ORGANIZED HEALTH CARE EDUCATION/TRAINING PROGRAM

## 2025-02-06 PROCEDURE — 3074F SYST BP LT 130 MM HG: CPT | Performed by: STUDENT IN AN ORGANIZED HEALTH CARE EDUCATION/TRAINING PROGRAM

## 2025-02-06 PROCEDURE — 1160F RVW MEDS BY RX/DR IN RCRD: CPT | Performed by: STUDENT IN AN ORGANIZED HEALTH CARE EDUCATION/TRAINING PROGRAM

## 2025-02-06 PROCEDURE — 99214 OFFICE O/P EST MOD 30 MIN: CPT | Performed by: STUDENT IN AN ORGANIZED HEALTH CARE EDUCATION/TRAINING PROGRAM

## 2025-02-06 PROCEDURE — 1159F MED LIST DOCD IN RCRD: CPT | Performed by: STUDENT IN AN ORGANIZED HEALTH CARE EDUCATION/TRAINING PROGRAM

## 2025-02-06 PROCEDURE — G2211 COMPLEX E/M VISIT ADD ON: HCPCS | Performed by: STUDENT IN AN ORGANIZED HEALTH CARE EDUCATION/TRAINING PROGRAM

## 2025-02-06 PROCEDURE — 3078F DIAST BP <80 MM HG: CPT | Performed by: STUDENT IN AN ORGANIZED HEALTH CARE EDUCATION/TRAINING PROGRAM

## 2025-02-06 RX ORDER — CYCLOPENTOLATE HYDROCHLORIDE 10 MG/ML
1 SOLUTION/ DROPS OPHTHALMIC
COMMUNITY
Start: 2025-01-29 | End: 2025-02-28

## 2025-02-06 RX ORDER — PREDNISOLONE ACETATE 10 MG/ML
1 SUSPENSION/ DROPS OPHTHALMIC 4 TIMES DAILY
COMMUNITY
Start: 2025-01-29 | End: 2025-02-28

## 2025-02-06 NOTE — PROGRESS NOTES
"Chief Complaint  Yovany Gonzalez is a 70 y.o. male presenting for Hypertension.     From [place]. Moved to [place] 2013. Worked as  in the past, retired 2018. . No children.     Patient has a past medical history of ACTH deficiency with secondary adrenal insufficiency, hypertension (2022), hyperlipidemia, colon adenoma, cataracts and presbyopia.    History of Present Illness  Patient is here for 6-month follow-up of his chronic health conditions.    He  continues to follow-up with endocrinology.  They were concerned for secondary hypertension, and started him on spironolactone.  He developed a rash and was transition to eplerenone.  He seems to be tolerating well, but has been noticing less energy at times, sometimes feeling weak.  No lightheadedness.  His home blood pressures usually in the 120s/80s.    He is little bit concerned about his cholesterol and would like to focus on this.  He did not tolerate statin in the past, also tried Zetia.    He was recently seen by ophthalmology at , was noticed with left neuritis.  This is currently improving.    The following portions of the patient's history were reviewed and updated as appropriate: allergies, current medications, past family history, past medical history, past social history, past surgical history, and problem list.    Objective  /78 (BP Location: Left arm, Patient Position: Sitting, Cuff Size: Adult)   Pulse 76   Temp 98.4 °F (36.9 °C) (Infrared)   Ht 180.3 cm (70.98\")   Wt 107 kg (236 lb 3.2 oz)   SpO2 97%   BMI 32.96 kg/m²     Physical Exam  Vitals reviewed.   Constitutional:       Appearance: Normal appearance.   HENT:      Head: Normocephalic and atraumatic.      Nose: Nose normal. No congestion.   Eyes:      Extraocular Movements: Extraocular movements intact.      Conjunctiva/sclera: Conjunctivae normal.      Comments: Xanthelasmas noted surrounding both eyes/eyelids.  Left pupil mildly dilated.  No significant " redness of the left eye.   Cardiovascular:      Rate and Rhythm: Normal rate and regular rhythm.      Heart sounds: Normal heart sounds. No murmur heard.  Pulmonary:      Effort: Pulmonary effort is normal. No respiratory distress.      Breath sounds: Normal breath sounds. No wheezing.   Musculoskeletal:      Cervical back: Neck supple.      Right lower leg: No edema.      Left lower leg: No edema.   Skin:     General: Skin is warm and dry.   Neurological:      Mental Status: He is alert and oriented to person, place, and time. Mental status is at baseline.   Psychiatric:         Behavior: Behavior normal.         Thought Content: Thought content normal.         Assessment/Plan   1. Essential hypertension  BP Readings from Last 3 Encounters:   02/06/25 124/78   12/03/24 144/100   09/17/24 141/88   Blood pressure significantly improved.  He is possibly having mild symptoms related to lower blood pressures, feeling more weak and decreased energy.  Hopefully this will get better.  Will recheck blood work before next visit with annual Medicare in 6 months.  - Comprehensive Metabolic Panel; Future    2. Hypercholesterolemia with LDL greater than 190 mg/dL  Statin intolerant.  Did not tolerate Zetia.  Will recheck lipids before next visit.  Could maybe consider Repatha.  - Lipid Panel; Future    3. Secondary adrenal insufficiency  Stable.  Continue on prednisone.  Follow-up with endocrinology    4. Acute iritis, left eye  Symptoms improving.  Continue on eyedrops per ophthalmology and follow-up at     5. Screening PSA (prostate specific antigen)  Patient is interested in continued screening for prostate cancer, normal in the past last in 2020  - PSA Screen; Future    6. Vitamin D deficiency  Patient was taking vitamin D 5000 units daily, he has quit.  He was noted with low vitamin D in the past.  Will recheck before next visit  - Vitamin D,25-Hydroxy; Future      Return in about 6 months (around 8/6/2025) for Medicare  Wellness.    Future Appointments         Provider Department Center    5/19/2025 2:00 PM (Arrive by 1:45 PM) Xenia Bryson MD [facility]CRIN[facility] [place]    9/3/2025 10:15 AM Anthony Cramer MD [facility] INTERNAL MEDICINE [place]              Anthony Cramer MD  Family Medicine  02/06/2025

## 2025-02-23 DIAGNOSIS — E27.49 SECONDARY ADRENAL INSUFFICIENCY: ICD-10-CM

## 2025-02-24 RX ORDER — PREDNISONE 5 MG/1
TABLET ORAL
Qty: 105 TABLET | Refills: 3 | Status: SHIPPED | OUTPATIENT
Start: 2025-02-24

## 2025-02-24 NOTE — TELEPHONE ENCOUNTER
Rx Refill Note  Requested Prescriptions     Pending Prescriptions Disp Refills    predniSONE (DELTASONE) 5 MG tablet [Pharmacy Med Name: PREDNISONE 5 MG TABLET] 105 tablet 0     Sig: TAKE 1 TABLET BY MOUTH IN THE MORNING PLUS EXTRA AS DIRECTED FOR ILLNESS, MAX EXTRA 5 TABS PER MONTH      Last office visit with prescribing clinician: 12/3/2024     Next office visit with prescribing clinician: 5/19/2025       Annalee Gardner  02/24/25, 08:23 EST

## 2025-05-19 ENCOUNTER — OFFICE VISIT (OUTPATIENT)
Dept: ENDOCRINOLOGY | Facility: CLINIC | Age: 70
End: 2025-05-19
Payer: MEDICARE

## 2025-05-19 VITALS
OXYGEN SATURATION: 98 % | DIASTOLIC BLOOD PRESSURE: 74 MMHG | HEART RATE: 68 BPM | SYSTOLIC BLOOD PRESSURE: 128 MMHG | HEIGHT: 71 IN | BODY MASS INDEX: 33.18 KG/M2 | WEIGHT: 237 LBS

## 2025-05-19 DIAGNOSIS — I10 ESSENTIAL HYPERTENSION: ICD-10-CM

## 2025-05-19 DIAGNOSIS — E27.49 SECONDARY ADRENAL INSUFFICIENCY: Primary | ICD-10-CM

## 2025-05-19 DIAGNOSIS — E78.5 DYSLIPIDEMIA: ICD-10-CM

## 2025-05-19 PROCEDURE — 3074F SYST BP LT 130 MM HG: CPT | Performed by: INTERNAL MEDICINE

## 2025-05-19 PROCEDURE — 3078F DIAST BP <80 MM HG: CPT | Performed by: INTERNAL MEDICINE

## 2025-05-19 PROCEDURE — 36415 COLL VENOUS BLD VENIPUNCTURE: CPT | Performed by: INTERNAL MEDICINE

## 2025-05-19 PROCEDURE — 99214 OFFICE O/P EST MOD 30 MIN: CPT | Performed by: INTERNAL MEDICINE

## 2025-05-19 PROCEDURE — 80053 COMPREHEN METABOLIC PANEL: CPT | Performed by: INTERNAL MEDICINE

## 2025-05-19 RX ORDER — EPLERENONE 50 MG/1
50 TABLET ORAL DAILY
COMMUNITY

## 2025-05-19 NOTE — PROGRESS NOTES
Chief Complaint   Patient presents with    secondary adrenal insufficiency     Follow up    Hypertension       HPI:   Yovany Gonzalez is a 70 y.o.male who returns to [facility] for f/u evaluation and management of his secondary adrenal insufficiency and hypertension. Last visit 12/03/2024. His history is as follows:    Interim Events:   - Reports no rash on the eplerenone 25 mg. However, reports SOA on the medication. See details below  - Taking prednisone as directed. May take an extra 1/2 tab when doing increased physical activity.     1) secondary adrenal insufficiency:  - diagnosed in 1994. Was living in [place] at the time. Presented to [facility] with symptoms of significant wt loss of 60 lbs, increased fatigue and hypersomnia. He recalls having serum AM and PM cortisol levels and a bone marrow biopsy as part of his evaluation. Not clear if he had any type of stimulation test at that time. MRI of [place] in 1994 and 1996 did not show an abnormality. Was told he had isolated ACTH deficiency.   - Has taken prednisone since time of diagnosis. Has tried BID dosing of hydrocortisone for approximately 1 month but did not feel well on this glucocorticoid.    Current Dose: prednisone 5 mg qAM  (May take an extra 1/2 tab when doing increased physical activity.)    Has not had to use stress dosing recently.  Not wearing a medical alert bracelet or necklace     DEXA ([facility], 09/13/2019) No osteoporosis, No osteopenia    2) familial hypercholesteremia:  - pt was prescribed rosuvastatin 20 mg daily by his PCP in 01/20201. (12/2020) Tchol 304, HDL 58, ,  prior to starting medication.  - Repeat Lipid panel 08/2021: Tchol 161, HDL 47, LDL 94,  - on Crestor 20 mg daily. Reported not tolerating the 20 mg dose of crestor. Did not tolerate 10 mg   - Did not tolerate other statins  - Pt stated his Zetia caused a rash when he is exposed to heat.  - pt did not try nexletol samples given to him at a  "prior visits    3) essential HTN:  - Had multiple rashes (heat sensitivity) on multiple HTN medications. I had pt complete multiple aldosterone/Renin labs to evaluate for hyperaldosteronism:  (2024, 9AM) aldosterone 12.7, renin 0.542 (0.167 - 5.380), A/R ratio 23.4 - on an ARB  (2024, 9AM) aldosterone 8.3, renin 0.275 (0.167 - 5.380), A/R ratio 30.1 - on no HTN medications    Although the patient pt did not have an elevated aldosterone level, his renin was low and his ratio was elevated. I discussed a trial of spironolactone with patient and he was agreeable to a trial of this medication.   (2024) Pt reported again rash on the spironolactone 25 mg. Switched to eplerenone 25 mg at that time to see if better tolerated    Current Rx: eplerenone 25 mg BID  - denies rash on this formulation. However, increased fatigue on this dosing    FMHx: No known CAD. Pt's mother  when he was 5 y.o. Paternal grandfather had an MI after age 60.     Review of Systems   Constitutional:  Positive for fatigue.        Wt stable   HENT:  Positive for hearing loss.    Eyes: Negative.    Respiratory: Negative.  Negative for cough.    Cardiovascular: Negative.    Gastrointestinal: Negative.    Endocrine: Positive for heat intolerance.   Genitourinary: Negative.    Musculoskeletal:  Positive for arthralgias. Negative for myalgias and neck stiffness.   Skin: Negative.    Allergic/Immunologic: Negative.    Neurological: Negative.  Negative for tremors and headaches.   Hematological: Negative.    Psychiatric/Behavioral: Negative.       The following portions of the patient's history were reviewed and updated as appropriate: allergies, current medications, past family history, past medical history, past social history, past surgical history and problem list.    /74 (BP Location: Left arm, Patient Position: Sitting, Cuff Size: Adult)   Pulse 68   Ht 180.3 cm (71\")   Wt 108 kg (237 lb)   SpO2 98%   BMI 33.05 kg/m² "   Physical Exam  Vitals reviewed.   Constitutional:       General: He is not in acute distress.     Appearance: He is well-developed. He is not diaphoretic.   HENT:      Head: Normocephalic.   Eyes:      Conjunctiva/sclera: Conjunctivae normal.      Pupils: Pupils are equal, round, and reactive to light.   Neck:      Thyroid: No thyromegaly.      Trachea: No tracheal deviation.      Comments: No palpable thyroid nodules    Cardiovascular:      Rate and Rhythm: Normal rate and regular rhythm.      Heart sounds: Normal heart sounds. No murmur heard.  Pulmonary:      Effort: Pulmonary effort is normal. No respiratory distress.      Breath sounds: Normal breath sounds.   Abdominal:      General: Bowel sounds are normal.      Palpations: Abdomen is soft. There is no mass.      Tenderness: There is no abdominal tenderness.   Musculoskeletal:      Cervical back: Neck supple.   Lymphadenopathy:      Cervical: No cervical adenopathy.   Skin:     General: Skin is warm and dry.      Findings: Lesion (eyelid xanthomas) present. No erythema.   Neurological:      Mental Status: He is alert and oriented to person, place, and time.      Cranial Nerves: No cranial nerve deficit.   Psychiatric:         Behavior: Behavior normal.       LABS/IMAGING: outside records reviewed and summarized in HPI  Office Visit on 05/19/2025   Component Date Value Ref Range Status    Glucose 05/19/2025 99  65 - 99 mg/dL Final    BUN 05/19/2025 18  8 - 23 mg/dL Final    Creatinine 05/19/2025 0.94  0.76 - 1.27 mg/dL Final    Sodium 05/19/2025 141  136 - 145 mmol/L Final    Potassium 05/19/2025 4.5  3.5 - 5.2 mmol/L Final    Chloride 05/19/2025 105  98 - 107 mmol/L Final    CO2 05/19/2025 25.0  22.0 - 29.0 mmol/L Final    Calcium 05/19/2025 9.2  8.6 - 10.5 mg/dL Final    Total Protein 05/19/2025 7.0  6.0 - 8.5 g/dL Final    Albumin 05/19/2025 4.0  3.5 - 5.2 g/dL Final    ALT (SGPT) 05/19/2025 15  1 - 41 U/L Final    AST (SGOT) 05/19/2025 20  1 - 40 U/L  Final    Alkaline Phosphatase 05/19/2025 65  39 - 117 U/L Final    Total Bilirubin 05/19/2025 0.2  0.0 - 1.2 mg/dL Final    Globulin 05/19/2025 3.0  gm/dL Final    A/G Ratio 05/19/2025 1.3  g/dL Final    BUN/Creatinine Ratio 05/19/2025 19.1  7.0 - 25.0 Final    Anion Gap 05/19/2025 11.0  5.0 - 15.0 mmol/L Final    eGFR 05/19/2025 87.8  >60.0 mL/min/1.73 Final       ASSESSMENT/PLAN:  1) secondary adrenal insufficiency:   - clinically without symptoms of AI  - Will increase prednisone to 5 mg qAM, 2.5 mg q afternoon to see if this helps his fatigue  May take an extra 1/2 tab when doing increased physical activity.   - Reviewed how to increase dosing during times of illness.   - Encouraged pt to wear a medical alert bracelet or necklace    2) essential HTN:  - Had multiple rashes (heat sensitivity) on multiple HTN medications. I had pt complete multiple aldosterone/Renin labs to evaluate for hyperaldosteronism:  (01/29/2024, 9AM) aldosterone 12.7, renin 0.542 (0.167 - 5.380), A/R ratio 23.4 - on an ARB  (02/23/2024, 9AM) aldosterone 8.3, renin 0.275 (0.167 - 5.380), A/R ratio 30.1 - on no HTN medications  - Although the patient pt did not have an elevated aldosterone level, his renin was low and his A/R ratio was elevated. Suspect primary hyperaldosteronism  - He reported not tolerating spironolactone 25 mg daily due to rash.   - He is not having rash on the eplerenone; however, he reports SOA. I offered referral to an allergist for further evaluation as he reports rash with multiple antihypertensives. He has declined referral to allergist at this time.   - His BP has improved significantly on eplerenone 25 mg BID. Pt reports fatigue on this dose. Will see if increasing his prednisone dose improves this report of fatigue.     3) dyslipidemia:  - pt was prescribed rosuvastatin 20 mg daily by his PCP in 01/20201. (12/2020) Tchol 304, HDL 58, ,  prior to starting medication.  - Repeat Lipid panel 08/2021:  Tchol 161, HDL 47, LDL 94,  - on Crestor 20 mg daily. Reported not tolerating the 20 mg dose of crestor. Did not tolerate 10 mg   - (05/2024) TChol 254, , HDL 49,  - on no medication  - Did not tolerate other statins  - Pt stated his Zetia caused a rash when he is exposed to heat.   - pt did not try nexletol samples given to him at a prior visits  - Pt declines PSK-9 inhibitor at this time    RTC 6 months    Electronically Signed: Xenia Bryson MD

## 2025-05-20 LAB
ALBUMIN SERPL-MCNC: 4 G/DL (ref 3.5–5.2)
ALBUMIN/GLOB SERPL: 1.3 G/DL
ALP SERPL-CCNC: 65 U/L (ref 39–117)
ALT SERPL W P-5'-P-CCNC: 15 U/L (ref 1–41)
ANION GAP SERPL CALCULATED.3IONS-SCNC: 11 MMOL/L (ref 5–15)
AST SERPL-CCNC: 20 U/L (ref 1–40)
BILIRUB SERPL-MCNC: 0.2 MG/DL (ref 0–1.2)
BUN SERPL-MCNC: 18 MG/DL (ref 8–23)
BUN/CREAT SERPL: 19.1 (ref 7–25)
CALCIUM SPEC-SCNC: 9.2 MG/DL (ref 8.6–10.5)
CHLORIDE SERPL-SCNC: 105 MMOL/L (ref 98–107)
CO2 SERPL-SCNC: 25 MMOL/L (ref 22–29)
CREAT SERPL-MCNC: 0.94 MG/DL (ref 0.76–1.27)
EGFRCR SERPLBLD CKD-EPI 2021: 87.8 ML/MIN/1.73
GLOBULIN UR ELPH-MCNC: 3 GM/DL
GLUCOSE SERPL-MCNC: 99 MG/DL (ref 65–99)
POTASSIUM SERPL-SCNC: 4.5 MMOL/L (ref 3.5–5.2)
PROT SERPL-MCNC: 7 G/DL (ref 6–8.5)
SODIUM SERPL-SCNC: 141 MMOL/L (ref 136–145)

## 2025-07-09 DIAGNOSIS — I10 ESSENTIAL HYPERTENSION: ICD-10-CM

## 2025-07-09 NOTE — TELEPHONE ENCOUNTER
Rx Refill Note  Requested Prescriptions     Pending Prescriptions Disp Refills    eplerenone (INSPRA) 25 MG tablet [Pharmacy Med Name: EPLERENONE 25 MG TABLET] 180 tablet 1     Sig: TAKE 2 TABLETS BY MOUTH EVERY DAY      Last office visit with prescribing clinician: 5/19/2025      Next office visit with prescribing clinician: 12/9/2025   {  Charu Kidd MA  07/09/25, 08:32 EDT

## 2025-07-11 RX ORDER — EPLERENONE 25 MG/1
25 TABLET ORAL 2 TIMES DAILY
Qty: 180 TABLET | Refills: 1 | Status: SHIPPED | OUTPATIENT
Start: 2025-07-11

## 2025-07-11 NOTE — TELEPHONE ENCOUNTER
Spoke with patient. He is taking eplerenone 25 mg BID. Rx refilled  Electronically Signed: Xenia Bryson MD

## 2025-08-04 ENCOUNTER — LAB (OUTPATIENT)
Dept: LAB | Facility: HOSPITAL | Age: 70
End: 2025-08-04
Payer: MEDICARE

## 2025-08-04 DIAGNOSIS — Z12.5 SCREENING PSA (PROSTATE SPECIFIC ANTIGEN): ICD-10-CM

## 2025-08-04 DIAGNOSIS — I10 ESSENTIAL HYPERTENSION: Chronic | ICD-10-CM

## 2025-08-04 DIAGNOSIS — E78.00 HYPERCHOLESTEROLEMIA WITH LDL GREATER THAN 190 MG/DL: Chronic | ICD-10-CM

## 2025-08-04 DIAGNOSIS — E55.9 VITAMIN D DEFICIENCY: ICD-10-CM

## 2025-08-04 PROCEDURE — G0103 PSA SCREENING: HCPCS

## 2025-08-04 PROCEDURE — 80061 LIPID PANEL: CPT

## 2025-08-04 PROCEDURE — 82306 VITAMIN D 25 HYDROXY: CPT

## 2025-08-04 PROCEDURE — 80053 COMPREHEN METABOLIC PANEL: CPT

## 2025-08-05 LAB
25(OH)D3 SERPL-MCNC: 35.7 NG/ML (ref 30–100)
ALBUMIN SERPL-MCNC: 3.8 G/DL (ref 3.5–5.2)
ALBUMIN/GLOB SERPL: 1.3 G/DL
ALP SERPL-CCNC: 59 U/L (ref 39–117)
ALT SERPL W P-5'-P-CCNC: 21 U/L (ref 1–41)
ANION GAP SERPL CALCULATED.3IONS-SCNC: 13.5 MMOL/L (ref 5–15)
AST SERPL-CCNC: 21 U/L (ref 1–40)
BILIRUB SERPL-MCNC: 0.2 MG/DL (ref 0–1.2)
BUN SERPL-MCNC: 17 MG/DL (ref 8–23)
BUN/CREAT SERPL: 15.6 (ref 7–25)
CALCIUM SPEC-SCNC: 9.1 MG/DL (ref 8.6–10.5)
CHLORIDE SERPL-SCNC: 103 MMOL/L (ref 98–107)
CHOLEST SERPL-MCNC: 277 MG/DL (ref 0–200)
CO2 SERPL-SCNC: 24.5 MMOL/L (ref 22–29)
CREAT SERPL-MCNC: 1.09 MG/DL (ref 0.76–1.27)
EGFRCR SERPLBLD CKD-EPI 2021: 73.5 ML/MIN/1.73
GLOBULIN UR ELPH-MCNC: 3 GM/DL
GLUCOSE SERPL-MCNC: 79 MG/DL (ref 65–99)
HDLC SERPL-MCNC: 43 MG/DL (ref 40–60)
LDLC SERPL CALC-MCNC: 199 MG/DL (ref 0–100)
LDLC/HDLC SERPL: 4.58 {RATIO}
POTASSIUM SERPL-SCNC: 3.7 MMOL/L (ref 3.5–5.2)
PROT SERPL-MCNC: 6.8 G/DL (ref 6–8.5)
PSA SERPL-MCNC: 2.1 NG/ML (ref 0–4)
SODIUM SERPL-SCNC: 141 MMOL/L (ref 136–145)
TRIGL SERPL-MCNC: 185 MG/DL (ref 0–150)
VLDLC SERPL-MCNC: 35 MG/DL (ref 5–40)

## (undated) DEVICE — Device

## (undated) DEVICE — VLV SXN AIR/H2O ORCAPOD3 1P/U STRL

## (undated) DEVICE — HYBRID CO2 TUBING/CAP SET FOR OLYMPUS® SCOPES & CO2 SOURCE: Brand: ERBE

## (undated) DEVICE — ENDOSCOPY PORT CONNECTOR FOR OLYMPUS® SCOPES: Brand: ERBE

## (undated) DEVICE — QUICK CATCH IN-LINE SUCTION POLYP TRAP IS USED FOR SUCTION RETRIEVAL OF ENDOSCOPICALLY REMOVED POLYPS.

## (undated) DEVICE — Device: Brand: DEFENDO AIR/WATER/SUCTION AND BIOPSY VALVE

## (undated) DEVICE — SNAR POLYP SENSATION STDOVL 27 240 BX40

## (undated) DEVICE — HYBRID TUBING/CAP SET FOR OLYMPUS® SCOPES: Brand: ERBE